# Patient Record
Sex: MALE | Race: WHITE | NOT HISPANIC OR LATINO | Employment: OTHER | URBAN - METROPOLITAN AREA
[De-identification: names, ages, dates, MRNs, and addresses within clinical notes are randomized per-mention and may not be internally consistent; named-entity substitution may affect disease eponyms.]

---

## 2017-01-06 DIAGNOSIS — R39.15 URGENCY OF URINATION: ICD-10-CM

## 2017-01-06 DIAGNOSIS — R39.12 WEAK URINARY STREAM: ICD-10-CM

## 2017-01-06 DIAGNOSIS — R35.0 URINARY FREQUENCY: ICD-10-CM

## 2017-01-06 LAB
ALBUMIN UR-MCNC: NEGATIVE MG/DL
APPEARANCE UR: CLEAR
BILIRUB UR QL STRIP: NEGATIVE
COLOR UR AUTO: YELLOW
GLUCOSE UR STRIP-MCNC: NEGATIVE MG/DL
HGB UR QL STRIP: NEGATIVE
KETONES UR STRIP-MCNC: NEGATIVE MG/DL
LEUKOCYTE ESTERASE UR QL STRIP: ABNORMAL
MUCOUS THREADS #/AREA URNS LPF: PRESENT /LPF
NITRATE UR QL: NEGATIVE
NON-SQ EPI CELLS #/AREA URNS LPF: ABNORMAL /LPF
PH UR STRIP: 5.5 PH (ref 5–7)
RBC #/AREA URNS AUTO: ABNORMAL /HPF (ref 0–2)
SP GR UR STRIP: 1.01 (ref 1–1.03)
URN SPEC COLLECT METH UR: ABNORMAL
UROBILINOGEN UR STRIP-ACNC: 0.2 EU/DL (ref 0.2–1)
WBC #/AREA URNS AUTO: ABNORMAL /HPF (ref 0–2)

## 2017-01-06 PROCEDURE — 87086 URINE CULTURE/COLONY COUNT: CPT | Mod: 90 | Performed by: UROLOGY

## 2017-01-06 PROCEDURE — 81001 URINALYSIS AUTO W/SCOPE: CPT | Performed by: UROLOGY

## 2017-01-06 PROCEDURE — 99000 SPECIMEN HANDLING OFFICE-LAB: CPT | Performed by: UROLOGY

## 2017-01-08 LAB
BACTERIA SPEC CULT: NORMAL
MICRO REPORT STATUS: NORMAL
SPECIMEN SOURCE: NORMAL

## 2017-02-07 ENCOUNTER — MEDICAL CORRESPONDENCE (OUTPATIENT)
Dept: HEALTH INFORMATION MANAGEMENT | Facility: CLINIC | Age: 77
End: 2017-02-07

## 2017-02-07 DIAGNOSIS — N13.8 HYPERTROPHY OF PROSTATE WITH URINARY OBSTRUCTION: Primary | ICD-10-CM

## 2017-02-07 DIAGNOSIS — N40.1 HYPERTROPHY OF PROSTATE WITH URINARY OBSTRUCTION: Primary | ICD-10-CM

## 2017-02-16 DIAGNOSIS — N40.1 HYPERTROPHY OF PROSTATE WITH URINARY OBSTRUCTION: Primary | ICD-10-CM

## 2017-02-16 DIAGNOSIS — N13.8 HYPERTROPHY OF PROSTATE WITH URINARY OBSTRUCTION: Primary | ICD-10-CM

## 2017-02-16 PROCEDURE — 87086 URINE CULTURE/COLONY COUNT: CPT | Performed by: UROLOGY

## 2017-02-18 LAB
BACTERIA SPEC CULT: NO GROWTH
MICRO REPORT STATUS: NORMAL
SPECIMEN SOURCE: NORMAL

## 2017-05-25 DIAGNOSIS — N13.8 HYPERTROPHY OF PROSTATE WITH URINARY OBSTRUCTION: Primary | ICD-10-CM

## 2017-05-25 DIAGNOSIS — N40.1 HYPERTROPHY OF PROSTATE WITH URINARY OBSTRUCTION: Primary | ICD-10-CM

## 2017-05-25 DIAGNOSIS — N39.41 URGE INCONTINENCE: ICD-10-CM

## 2017-05-25 LAB
ALBUMIN UR-MCNC: 30 MG/DL
APPEARANCE UR: ABNORMAL
BACTERIA #/AREA URNS HPF: ABNORMAL /HPF
BILIRUB UR QL STRIP: NEGATIVE
COLOR UR AUTO: YELLOW
GLUCOSE UR STRIP-MCNC: NEGATIVE MG/DL
HGB UR QL STRIP: NEGATIVE
HYALINE CASTS #/AREA URNS LPF: ABNORMAL /LPF (ref 0–2)
KETONES UR STRIP-MCNC: NEGATIVE MG/DL
LEUKOCYTE ESTERASE UR QL STRIP: NEGATIVE
MUCOUS THREADS #/AREA URNS LPF: PRESENT /LPF
NITRATE UR QL: NEGATIVE
NON-SQ EPI CELLS #/AREA URNS LPF: ABNORMAL /LPF
PH UR STRIP: 5 PH (ref 5–7)
RBC #/AREA URNS AUTO: ABNORMAL /HPF (ref 0–2)
SP GR UR STRIP: >1.03 (ref 1–1.03)
URN SPEC COLLECT METH UR: ABNORMAL
UROBILINOGEN UR STRIP-ACNC: 0.2 EU/DL (ref 0.2–1)
WBC #/AREA URNS AUTO: ABNORMAL /HPF (ref 0–2)

## 2017-05-25 PROCEDURE — 87086 URINE CULTURE/COLONY COUNT: CPT | Performed by: UROLOGY

## 2017-05-25 PROCEDURE — 81001 URINALYSIS AUTO W/SCOPE: CPT | Performed by: UROLOGY

## 2017-05-27 LAB
BACTERIA SPEC CULT: NORMAL
MICRO REPORT STATUS: NORMAL
SPECIMEN SOURCE: NORMAL

## 2018-06-05 DIAGNOSIS — R35.1 NOCTURIA: ICD-10-CM

## 2018-06-05 DIAGNOSIS — N13.8 HYPERTROPHY OF PROSTATE WITH URINARY OBSTRUCTION: Primary | ICD-10-CM

## 2018-06-05 DIAGNOSIS — N40.1 HYPERTROPHY OF PROSTATE WITH URINARY OBSTRUCTION: Primary | ICD-10-CM

## 2018-06-05 DIAGNOSIS — R39.15 URGENCY OF URINATION: ICD-10-CM

## 2018-06-05 LAB
ALBUMIN UR-MCNC: 30 MG/DL
APPEARANCE UR: CLEAR
BACTERIA #/AREA URNS HPF: ABNORMAL /HPF
BILIRUB UR QL STRIP: ABNORMAL
COLOR UR AUTO: YELLOW
GLUCOSE UR STRIP-MCNC: NEGATIVE MG/DL
HGB UR QL STRIP: NEGATIVE
KETONES UR STRIP-MCNC: ABNORMAL MG/DL
LEUKOCYTE ESTERASE UR QL STRIP: NEGATIVE
MUCOUS THREADS #/AREA URNS LPF: PRESENT /LPF
NITRATE UR QL: NEGATIVE
NON-SQ EPI CELLS #/AREA URNS LPF: ABNORMAL /LPF
PH UR STRIP: 5 PH (ref 5–7)
PSA SERPL-MCNC: 2.32 UG/L (ref 0–4)
RBC #/AREA URNS AUTO: ABNORMAL /HPF
SOURCE: ABNORMAL
SP GR UR STRIP: 1.02 (ref 1–1.03)
UROBILINOGEN UR STRIP-ACNC: 0.2 EU/DL (ref 0.2–1)
WBC #/AREA URNS AUTO: ABNORMAL /HPF

## 2018-06-05 PROCEDURE — 87086 URINE CULTURE/COLONY COUNT: CPT | Performed by: UROLOGY

## 2018-06-05 PROCEDURE — 36415 COLL VENOUS BLD VENIPUNCTURE: CPT | Performed by: UROLOGY

## 2018-06-05 PROCEDURE — 84153 ASSAY OF PSA TOTAL: CPT | Performed by: UROLOGY

## 2018-06-05 PROCEDURE — 81001 URINALYSIS AUTO W/SCOPE: CPT | Performed by: UROLOGY

## 2018-06-06 LAB
BACTERIA SPEC CULT: NO GROWTH
Lab: NORMAL
SPECIMEN SOURCE: NORMAL

## 2019-06-11 DIAGNOSIS — R39.15 BENIGN PROSTATIC HYPERPLASIA (BPH) WITH URINARY URGENCY: ICD-10-CM

## 2019-06-11 DIAGNOSIS — N40.0 BENIGN ENLARGEMENT OF PROSTATE: ICD-10-CM

## 2019-06-11 DIAGNOSIS — R35.0 URINARY FREQUENCY: Primary | ICD-10-CM

## 2019-06-11 DIAGNOSIS — N40.1 BENIGN PROSTATIC HYPERPLASIA (BPH) WITH URINARY URGENCY: ICD-10-CM

## 2019-06-11 LAB
ALBUMIN UR-MCNC: NEGATIVE MG/DL
APPEARANCE UR: CLEAR
BACTERIA #/AREA URNS HPF: ABNORMAL /HPF
BILIRUB UR QL STRIP: NEGATIVE
COLOR UR AUTO: YELLOW
GLUCOSE UR STRIP-MCNC: NEGATIVE MG/DL
HGB UR QL STRIP: NEGATIVE
KETONES UR STRIP-MCNC: NEGATIVE MG/DL
LEUKOCYTE ESTERASE UR QL STRIP: NEGATIVE
MUCOUS THREADS #/AREA URNS LPF: PRESENT /LPF
NITRATE UR QL: NEGATIVE
NON-SQ EPI CELLS #/AREA URNS LPF: ABNORMAL /LPF
PH UR STRIP: 5.5 PH (ref 5–7)
PSA SERPL-MCNC: 2.86 UG/L (ref 0–4)
RBC #/AREA URNS AUTO: ABNORMAL /HPF
SOURCE: ABNORMAL
SP GR UR STRIP: 1.02 (ref 1–1.03)
UROBILINOGEN UR STRIP-ACNC: 0.2 EU/DL (ref 0.2–1)
WBC #/AREA URNS AUTO: ABNORMAL /HPF

## 2019-06-11 PROCEDURE — 36415 COLL VENOUS BLD VENIPUNCTURE: CPT | Performed by: UROLOGY

## 2019-06-11 PROCEDURE — 84153 ASSAY OF PSA TOTAL: CPT | Performed by: UROLOGY

## 2019-06-11 PROCEDURE — 87086 URINE CULTURE/COLONY COUNT: CPT | Performed by: UROLOGY

## 2019-06-11 PROCEDURE — 81001 URINALYSIS AUTO W/SCOPE: CPT | Performed by: UROLOGY

## 2019-06-12 LAB
BACTERIA SPEC CULT: NO GROWTH
Lab: NORMAL
SPECIMEN SOURCE: NORMAL

## 2020-07-01 DIAGNOSIS — R39.15 URGENCY OF URINATION: ICD-10-CM

## 2020-07-01 DIAGNOSIS — N40.0 BENIGN ENLARGEMENT OF PROSTATE: Primary | ICD-10-CM

## 2020-07-01 LAB
ALBUMIN UR-MCNC: NEGATIVE MG/DL
APPEARANCE UR: CLEAR
BACTERIA #/AREA URNS HPF: ABNORMAL /HPF
BILIRUB UR QL STRIP: ABNORMAL
COLOR UR AUTO: YELLOW
GLUCOSE UR STRIP-MCNC: NEGATIVE MG/DL
HGB UR QL STRIP: NEGATIVE
KETONES UR STRIP-MCNC: NEGATIVE MG/DL
LEUKOCYTE ESTERASE UR QL STRIP: NEGATIVE
NITRATE UR QL: NEGATIVE
NON-SQ EPI CELLS #/AREA URNS LPF: ABNORMAL /LPF
PH UR STRIP: 5.5 PH (ref 5–7)
PSA SERPL-MCNC: 2.74 UG/L (ref 0–4)
RBC #/AREA URNS AUTO: ABNORMAL /HPF
SOURCE: ABNORMAL
SP GR UR STRIP: 1.02 (ref 1–1.03)
UROBILINOGEN UR STRIP-ACNC: 0.2 EU/DL (ref 0.2–1)
WBC #/AREA URNS AUTO: ABNORMAL /HPF

## 2020-07-01 PROCEDURE — 36415 COLL VENOUS BLD VENIPUNCTURE: CPT | Performed by: UROLOGY

## 2020-07-01 PROCEDURE — 87086 URINE CULTURE/COLONY COUNT: CPT | Performed by: UROLOGY

## 2020-07-01 PROCEDURE — 81001 URINALYSIS AUTO W/SCOPE: CPT | Performed by: UROLOGY

## 2020-07-01 PROCEDURE — 84153 ASSAY OF PSA TOTAL: CPT | Performed by: UROLOGY

## 2020-07-02 LAB
BACTERIA SPEC CULT: NO GROWTH
Lab: NORMAL
SPECIMEN SOURCE: NORMAL

## 2020-10-29 ENCOUNTER — TELEPHONE (OUTPATIENT)
Dept: SCHEDULING | Facility: CLINIC | Age: 80
End: 2020-10-29

## 2021-07-27 ENCOUNTER — MEDICAL CORRESPONDENCE (OUTPATIENT)
Dept: HEALTH INFORMATION MANAGEMENT | Facility: CLINIC | Age: 81
End: 2021-07-27

## 2021-07-27 ENCOUNTER — LAB (OUTPATIENT)
Dept: LAB | Facility: OTHER | Age: 81
End: 2021-07-27
Payer: COMMERCIAL

## 2021-07-27 DIAGNOSIS — R35.1 NOCTURIA: ICD-10-CM

## 2021-07-27 DIAGNOSIS — R39.15 URGENCY OF URINATION: ICD-10-CM

## 2021-07-27 DIAGNOSIS — R33.8 BENIGN LOCALIZED HYPERPLASIA OF PROSTATE WITH URINARY RETENTION: Primary | ICD-10-CM

## 2021-07-27 DIAGNOSIS — N40.1 BENIGN LOCALIZED HYPERPLASIA OF PROSTATE WITH URINARY RETENTION: Primary | ICD-10-CM

## 2021-07-27 LAB
ALBUMIN UR-MCNC: 30 MG/DL
APPEARANCE UR: CLEAR
BACTERIA #/AREA URNS HPF: ABNORMAL /HPF
BILIRUB UR QL STRIP: ABNORMAL
COLOR UR AUTO: YELLOW
GLUCOSE UR STRIP-MCNC: NEGATIVE MG/DL
HGB UR QL STRIP: NEGATIVE
HYALINE CASTS #/AREA URNS LPF: ABNORMAL /LPF
KETONES UR STRIP-MCNC: ABNORMAL MG/DL
LEUKOCYTE ESTERASE UR QL STRIP: NEGATIVE
NITRATE UR QL: NEGATIVE
PH UR STRIP: 5.5 [PH] (ref 5–7)
PSA SERPL-MCNC: 4.42 UG/L (ref 0–4)
RBC #/AREA URNS AUTO: ABNORMAL /HPF
SP GR UR STRIP: >=1.03 (ref 1–1.03)
SQUAMOUS #/AREA URNS AUTO: ABNORMAL /LPF
UROBILINOGEN UR STRIP-ACNC: 0.2 E.U./DL
WBC #/AREA URNS AUTO: ABNORMAL /HPF

## 2021-07-27 PROCEDURE — 84153 ASSAY OF PSA TOTAL: CPT

## 2021-07-27 PROCEDURE — 87086 URINE CULTURE/COLONY COUNT: CPT

## 2021-07-27 PROCEDURE — 36415 COLL VENOUS BLD VENIPUNCTURE: CPT

## 2021-07-27 PROCEDURE — 81001 URINALYSIS AUTO W/SCOPE: CPT

## 2021-07-28 LAB — BACTERIA UR CULT: NO GROWTH

## 2023-01-01 ENCOUNTER — APPOINTMENT (OUTPATIENT)
Dept: CT IMAGING | Facility: CLINIC | Age: 83
DRG: 871 | End: 2023-01-01
Attending: PEDIATRICS
Payer: COMMERCIAL

## 2023-01-01 ENCOUNTER — APPOINTMENT (OUTPATIENT)
Dept: CT IMAGING | Facility: CLINIC | Age: 83
DRG: 871 | End: 2023-01-01
Attending: FAMILY MEDICINE
Payer: COMMERCIAL

## 2023-01-01 ENCOUNTER — LAB REQUISITION (OUTPATIENT)
Dept: LAB | Facility: CLINIC | Age: 83
End: 2023-01-01
Payer: COMMERCIAL

## 2023-01-01 ENCOUNTER — HOSPITAL ENCOUNTER (INPATIENT)
Facility: CLINIC | Age: 83
LOS: 7 days | Discharge: ANOTHER HOSPITAL | DRG: 871 | End: 2023-10-01
Attending: STUDENT IN AN ORGANIZED HEALTH CARE EDUCATION/TRAINING PROGRAM | Admitting: INTERNAL MEDICINE
Payer: COMMERCIAL

## 2023-01-01 ENCOUNTER — APPOINTMENT (OUTPATIENT)
Dept: CARDIOLOGY | Facility: CLINIC | Age: 83
DRG: 871 | End: 2023-01-01
Attending: NURSE PRACTITIONER
Payer: COMMERCIAL

## 2023-01-01 ENCOUNTER — APPOINTMENT (OUTPATIENT)
Dept: CT IMAGING | Facility: CLINIC | Age: 83
DRG: 871 | End: 2023-01-01
Attending: STUDENT IN AN ORGANIZED HEALTH CARE EDUCATION/TRAINING PROGRAM
Payer: COMMERCIAL

## 2023-01-01 ENCOUNTER — HOSPITAL ENCOUNTER (EMERGENCY)
Facility: CLINIC | Age: 83
Discharge: HOME | DRG: 871 | End: 2023-09-22
Attending: STUDENT IN AN ORGANIZED HEALTH CARE EDUCATION/TRAINING PROGRAM | Admitting: STUDENT IN AN ORGANIZED HEALTH CARE EDUCATION/TRAINING PROGRAM
Payer: COMMERCIAL

## 2023-01-01 ENCOUNTER — NURSE TRIAGE (OUTPATIENT)
Dept: NURSING | Facility: CLINIC | Age: 83
End: 2023-01-01
Payer: COMMERCIAL

## 2023-01-01 ENCOUNTER — APPOINTMENT (OUTPATIENT)
Dept: GENERAL RADIOLOGY | Facility: CLINIC | Age: 83
DRG: 871 | End: 2023-01-01
Attending: STUDENT IN AN ORGANIZED HEALTH CARE EDUCATION/TRAINING PROGRAM
Payer: COMMERCIAL

## 2023-01-01 ENCOUNTER — HOSPITAL ENCOUNTER (OUTPATIENT)
Dept: ULTRASOUND IMAGING | Facility: CLINIC | Age: 83
Discharge: HOME | DRG: 871 | End: 2023-09-29
Attending: PEDIATRICS
Payer: COMMERCIAL

## 2023-01-01 VITALS
OXYGEN SATURATION: 92 % | SYSTOLIC BLOOD PRESSURE: 103 MMHG | RESPIRATION RATE: 22 BRPM | BODY MASS INDEX: 32.72 KG/M2 | DIASTOLIC BLOOD PRESSURE: 74 MMHG | WEIGHT: 206 LBS | HEART RATE: 91 BPM | TEMPERATURE: 97.9 F

## 2023-01-01 VITALS
BODY MASS INDEX: 33.43 KG/M2 | HEART RATE: 107 BPM | HEIGHT: 66 IN | WEIGHT: 208 LBS | TEMPERATURE: 99.3 F | SYSTOLIC BLOOD PRESSURE: 117 MMHG | OXYGEN SATURATION: 92 % | RESPIRATION RATE: 20 BRPM | DIASTOLIC BLOOD PRESSURE: 63 MMHG

## 2023-01-01 DIAGNOSIS — C34.90 LUNG CANCER (H): Primary | ICD-10-CM

## 2023-01-01 DIAGNOSIS — A15.0 TUBERCULOSIS OF LUNG: ICD-10-CM

## 2023-01-01 DIAGNOSIS — I25.10 ATHEROSCLEROSIS OF NATIVE CORONARY ARTERY OF NATIVE HEART WITHOUT ANGINA PECTORIS: ICD-10-CM

## 2023-01-01 DIAGNOSIS — J15.0 PNEUMONIA DUE TO KLEBSIELLA PNEUMONIAE (H): ICD-10-CM

## 2023-01-01 DIAGNOSIS — C34.12 PRIMARY NON-SMALL CELL CARCINOMA OF UPPER LOBE OF LEFT LUNG (H): Primary | ICD-10-CM

## 2023-01-01 DIAGNOSIS — J18.9 PNEUMONIA DUE TO INFECTIOUS ORGANISM, UNSPECIFIED LATERALITY, UNSPECIFIED PART OF LUNG: Primary | ICD-10-CM

## 2023-01-01 DIAGNOSIS — I50.9 HEART FAILURE, UNSPECIFIED (H): ICD-10-CM

## 2023-01-01 DIAGNOSIS — F03.90 SENILE DEMENTIA (H): ICD-10-CM

## 2023-01-01 DIAGNOSIS — J15.9 COMMUNITY ACQUIRED BACTERIAL PNEUMONIA: ICD-10-CM

## 2023-01-01 DIAGNOSIS — J18.9 PNEUMONIA: Primary | ICD-10-CM

## 2023-01-01 DIAGNOSIS — J44.1 COPD EXACERBATION (H): ICD-10-CM

## 2023-01-01 DIAGNOSIS — D72.829 LEUKOCYTOSIS, UNSPECIFIED TYPE: ICD-10-CM

## 2023-01-01 DIAGNOSIS — D84.9 IMMUNOCOMPROMISED (H): ICD-10-CM

## 2023-01-01 DIAGNOSIS — Z95.828 PORT-A-CATH IN PLACE: ICD-10-CM

## 2023-01-01 DIAGNOSIS — E78.5 HYPERLIPIDEMIA WITH TARGET LDL LESS THAN 70: ICD-10-CM

## 2023-01-01 DIAGNOSIS — J90 PLEURAL EFFUSION ON LEFT: ICD-10-CM

## 2023-01-01 DIAGNOSIS — I10 HYPERTENSION GOAL BP (BLOOD PRESSURE) < 140/90: ICD-10-CM

## 2023-01-01 DIAGNOSIS — J18.9 PNEUMONIA OF LEFT UPPER LOBE DUE TO INFECTIOUS ORGANISM: ICD-10-CM

## 2023-01-01 DIAGNOSIS — R65.10 SIRS (SYSTEMIC INFLAMMATORY RESPONSE SYNDROME) (H): ICD-10-CM

## 2023-01-01 DIAGNOSIS — J96.21 ACUTE ON CHRONIC RESPIRATORY FAILURE WITH HYPOXIA (H): ICD-10-CM

## 2023-01-01 LAB
ALBUMIN SERPL BCG-MCNC: 4.1 G/DL (ref 3.5–5.2)
ALBUMIN SERPL BCG-MCNC: 4.3 G/DL (ref 3.5–5.2)
ALBUMIN UR-MCNC: 30 MG/DL
ALP SERPL-CCNC: 101 U/L (ref 40–129)
ALP SERPL-CCNC: 94 U/L (ref 40–129)
ALT SERPL W P-5'-P-CCNC: 20 U/L (ref 0–70)
ALT SERPL W P-5'-P-CCNC: 22 U/L (ref 0–70)
AMORPH CRY #/AREA URNS HPF: ABNORMAL /HPF
ANION GAP SERPL CALCULATED.3IONS-SCNC: 10 MMOL/L (ref 7–15)
ANION GAP SERPL CALCULATED.3IONS-SCNC: 11 MMOL/L (ref 7–15)
ANION GAP SERPL CALCULATED.3IONS-SCNC: 12 MMOL/L (ref 7–15)
ANION GAP SERPL CALCULATED.3IONS-SCNC: 13 MMOL/L (ref 7–15)
ANION GAP SERPL CALCULATED.3IONS-SCNC: 14 MMOL/L (ref 7–15)
ANION GAP SERPL CALCULATED.3IONS-SCNC: 14 MMOL/L (ref 7–15)
ANION GAP SERPL CALCULATED.3IONS-SCNC: 9 MMOL/L (ref 7–15)
APPEARANCE FLD: ABNORMAL
APPEARANCE UR: ABNORMAL
AST SERPL W P-5'-P-CCNC: 24 U/L (ref 0–45)
AST SERPL W P-5'-P-CCNC: 29 U/L (ref 0–45)
BACTERIA BLD CULT: NO GROWTH
BACTERIA BLD CULT: NO GROWTH
BACTERIA PLR CULT: NO GROWTH
BACTERIA PLR CULT: NO GROWTH
BACTERIA PLR CULT: NORMAL
BASE EXCESS BLDV CALC-SCNC: 1 MMOL/L (ref -7.7–1.9)
BASE EXCESS BLDV CALC-SCNC: 2.1 MMOL/L (ref -7.7–1.9)
BASOPHILS # BLD MANUAL: 0 10E3/UL (ref 0–0.2)
BASOPHILS NFR BLD MANUAL: 0 %
BILIRUB SERPL-MCNC: 0.6 MG/DL
BILIRUB SERPL-MCNC: 0.7 MG/DL
BILIRUB UR QL STRIP: NEGATIVE
BUN SERPL-MCNC: 10.4 MG/DL (ref 8–23)
BUN SERPL-MCNC: 10.8 MG/DL (ref 8–23)
BUN SERPL-MCNC: 12.1 MG/DL (ref 8–23)
BUN SERPL-MCNC: 12.7 MG/DL (ref 8–23)
BUN SERPL-MCNC: 12.7 MG/DL (ref 8–23)
BUN SERPL-MCNC: 13.8 MG/DL (ref 8–23)
BUN SERPL-MCNC: 17.9 MG/DL (ref 8–23)
BUN SERPL-MCNC: 8.7 MG/DL (ref 8–23)
BUN SERPL-MCNC: 8.9 MG/DL (ref 8–23)
BUN SERPL-MCNC: 9 MG/DL (ref 8–23)
BUN SERPL-MCNC: 9.7 MG/DL (ref 8–23)
CALCIUM SERPL-MCNC: 8.4 MG/DL (ref 8.8–10.2)
CALCIUM SERPL-MCNC: 8.5 MG/DL (ref 8.8–10.2)
CALCIUM SERPL-MCNC: 8.6 MG/DL (ref 8.8–10.2)
CALCIUM SERPL-MCNC: 8.6 MG/DL (ref 8.8–10.2)
CALCIUM SERPL-MCNC: 8.8 MG/DL (ref 8.8–10.2)
CALCIUM SERPL-MCNC: 9 MG/DL (ref 8.8–10.2)
CALCIUM SERPL-MCNC: 9.1 MG/DL (ref 8.8–10.2)
CALCIUM SERPL-MCNC: 9.2 MG/DL (ref 8.8–10.2)
CALCIUM SERPL-MCNC: 9.3 MG/DL (ref 8.8–10.2)
CELL COUNT BODY FLUID SOURCE: ABNORMAL
CHLORIDE SERPL-SCNC: 100 MMOL/L (ref 98–107)
CHLORIDE SERPL-SCNC: 101 MMOL/L (ref 98–107)
CHLORIDE SERPL-SCNC: 102 MMOL/L (ref 98–107)
CHLORIDE SERPL-SCNC: 103 MMOL/L (ref 98–107)
CHLORIDE SERPL-SCNC: 103 MMOL/L (ref 98–107)
CHLORIDE SERPL-SCNC: 104 MMOL/L (ref 98–107)
CHLORIDE SERPL-SCNC: 98 MMOL/L (ref 98–107)
CHOLEST FLD-MCNC: 68 MG/DL
CHOLESTEROL BODY FLUID SOURCE: NORMAL
COLOR FLD: ABNORMAL
COLOR UR AUTO: YELLOW
CREAT SERPL-MCNC: 0.9 MG/DL (ref 0.67–1.17)
CREAT SERPL-MCNC: 0.91 MG/DL (ref 0.67–1.17)
CREAT SERPL-MCNC: 0.93 MG/DL (ref 0.67–1.17)
CREAT SERPL-MCNC: 0.93 MG/DL (ref 0.67–1.17)
CREAT SERPL-MCNC: 0.95 MG/DL (ref 0.67–1.17)
CREAT SERPL-MCNC: 0.97 MG/DL (ref 0.67–1.17)
CREAT SERPL-MCNC: 0.98 MG/DL (ref 0.67–1.17)
CREAT SERPL-MCNC: 1.05 MG/DL (ref 0.67–1.17)
CREAT SERPL-MCNC: 1.08 MG/DL (ref 0.67–1.17)
CREAT SERPL-MCNC: 1.1 MG/DL (ref 0.67–1.17)
CREAT SERPL-MCNC: 1.15 MG/DL (ref 0.67–1.17)
CRP SERPL-MCNC: 114.96 MG/L
CRP SERPL-MCNC: 17 MG/L
CRP SERPL-MCNC: 244.68 MG/L
CRP SERPL-MCNC: 61.24 MG/L
CRP SERPL-MCNC: 71.69 MG/L
CRP SERPL-MCNC: 71.69 MG/L
CRP SERPL-MCNC: 89.13 MG/L
CRP SERPL-MCNC: 90.34 MG/L
DEPRECATED HCO3 PLAS-SCNC: 21 MMOL/L (ref 22–29)
DEPRECATED HCO3 PLAS-SCNC: 23 MMOL/L (ref 22–29)
DEPRECATED HCO3 PLAS-SCNC: 24 MMOL/L (ref 22–29)
DEPRECATED HCO3 PLAS-SCNC: 30 MMOL/L (ref 22–29)
EGFRCR SERPLBLD CKD-EPI 2021: 64 ML/MIN/1.73M2
EGFRCR SERPLBLD CKD-EPI 2021: 67 ML/MIN/1.73M2
EGFRCR SERPLBLD CKD-EPI 2021: 69 ML/MIN/1.73M2
EGFRCR SERPLBLD CKD-EPI 2021: 71 ML/MIN/1.73M2
EGFRCR SERPLBLD CKD-EPI 2021: 77 ML/MIN/1.73M2
EGFRCR SERPLBLD CKD-EPI 2021: 78 ML/MIN/1.73M2
EGFRCR SERPLBLD CKD-EPI 2021: 80 ML/MIN/1.73M2
EGFRCR SERPLBLD CKD-EPI 2021: 82 ML/MIN/1.73M2
EGFRCR SERPLBLD CKD-EPI 2021: 82 ML/MIN/1.73M2
EGFRCR SERPLBLD CKD-EPI 2021: 84 ML/MIN/1.73M2
EGFRCR SERPLBLD CKD-EPI 2021: 85 ML/MIN/1.73M2
EOSINOPHIL # BLD MANUAL: 0 10E3/UL (ref 0–0.7)
EOSINOPHIL NFR BLD MANUAL: 0 %
ERYTHROCYTE [DISTWIDTH] IN BLOOD BY AUTOMATED COUNT: 16.5 % (ref 10–15)
ERYTHROCYTE [DISTWIDTH] IN BLOOD BY AUTOMATED COUNT: 16.5 % (ref 10–15)
ERYTHROCYTE [DISTWIDTH] IN BLOOD BY AUTOMATED COUNT: 16.6 % (ref 10–15)
ERYTHROCYTE [DISTWIDTH] IN BLOOD BY AUTOMATED COUNT: 16.7 % (ref 10–15)
ERYTHROCYTE [DISTWIDTH] IN BLOOD BY AUTOMATED COUNT: 16.8 % (ref 10–15)
ERYTHROCYTE [DISTWIDTH] IN BLOOD BY AUTOMATED COUNT: 16.8 % (ref 10–15)
ERYTHROCYTE [DISTWIDTH] IN BLOOD BY AUTOMATED COUNT: 17.2 % (ref 10–15)
FLUAV RNA SPEC QL NAA+PROBE: NEGATIVE
FLUBV RNA RESP QL NAA+PROBE: NEGATIVE
GAMMA INTERFERON BACKGROUND BLD IA-ACNC: 0 IU/ML
GLUCOSE BLDC GLUCOMTR-MCNC: 123 MG/DL (ref 70–99)
GLUCOSE BLDC GLUCOMTR-MCNC: 97 MG/DL (ref 70–99)
GLUCOSE BLDC GLUCOMTR-MCNC: 99 MG/DL (ref 70–99)
GLUCOSE BODY FLUID SOURCE: NORMAL
GLUCOSE FLD-MCNC: 40 MG/DL
GLUCOSE SERPL-MCNC: 101 MG/DL (ref 70–99)
GLUCOSE SERPL-MCNC: 103 MG/DL (ref 70–99)
GLUCOSE SERPL-MCNC: 104 MG/DL (ref 70–99)
GLUCOSE SERPL-MCNC: 107 MG/DL (ref 70–99)
GLUCOSE SERPL-MCNC: 115 MG/DL (ref 70–99)
GLUCOSE SERPL-MCNC: 117 MG/DL (ref 70–99)
GLUCOSE SERPL-MCNC: 129 MG/DL (ref 70–99)
GLUCOSE SERPL-MCNC: 136 MG/DL (ref 70–99)
GLUCOSE SERPL-MCNC: 149 MG/DL (ref 70–99)
GLUCOSE SERPL-MCNC: 170 MG/DL (ref 70–99)
GLUCOSE SERPL-MCNC: 91 MG/DL (ref 70–99)
GLUCOSE UR STRIP-MCNC: NEGATIVE MG/DL
GRAM STAIN RESULT: NORMAL
HCO3 BLDV-SCNC: 27 MMOL/L (ref 21–28)
HCO3 BLDV-SCNC: 27 MMOL/L (ref 21–28)
HCO3 SERPL-SCNC: 25 MMOL/L (ref 22–29)
HCT VFR BLD AUTO: 33 % (ref 40–53)
HCT VFR BLD AUTO: 34.3 % (ref 40–53)
HCT VFR BLD AUTO: 34.7 % (ref 40–53)
HCT VFR BLD AUTO: 35.1 % (ref 40–53)
HCT VFR BLD AUTO: 35.7 % (ref 40–53)
HCT VFR BLD AUTO: 35.7 % (ref 40–53)
HCT VFR BLD AUTO: 36.5 % (ref 40–53)
HCT VFR BLD AUTO: 36.8 % (ref 40–53)
HCT VFR BLD AUTO: 39.2 % (ref 40–53)
HGB BLD-MCNC: 10.5 G/DL (ref 13.3–17.7)
HGB BLD-MCNC: 10.9 G/DL (ref 13.3–17.7)
HGB BLD-MCNC: 11 G/DL (ref 13.3–17.7)
HGB BLD-MCNC: 11.4 G/DL (ref 13.3–17.7)
HGB BLD-MCNC: 11.5 G/DL (ref 13.3–17.7)
HGB BLD-MCNC: 11.7 G/DL (ref 13.3–17.7)
HGB BLD-MCNC: 11.8 G/DL (ref 13.3–17.7)
HGB BLD-MCNC: 11.8 G/DL (ref 13.3–17.7)
HGB BLD-MCNC: 12.3 G/DL (ref 13.3–17.7)
HGB BLD-MCNC: 13 G/DL (ref 13.3–17.7)
HGB UR QL STRIP: NEGATIVE
HOLD SPECIMEN: NORMAL
HYALINE CASTS: 5 /LPF
INR PPP: 1.19 (ref 0.85–1.15)
KETONES UR STRIP-MCNC: NEGATIVE MG/DL
LACTATE SERPL-SCNC: 0.7 MMOL/L (ref 0.7–2)
LACTATE SERPL-SCNC: 0.7 MMOL/L (ref 0.7–2)
LACTATE SERPL-SCNC: 0.9 MMOL/L (ref 0.7–2)
LACTATE SERPL-SCNC: 1 MMOL/L (ref 0.7–2)
LACTATE SERPL-SCNC: 1 MMOL/L (ref 0.7–2)
LACTATE SERPL-SCNC: 1.1 MMOL/L (ref 0.7–2)
LACTATE SERPL-SCNC: 1.2 MMOL/L (ref 0.7–2)
LACTATE SERPL-SCNC: 2.2 MMOL/L (ref 0.7–2)
LD BODY BODY FLUID SOURCE: NORMAL
LDH FLD L TO P-CCNC: NORMAL U/L
LDH SERPL L TO P-CCNC: 238 U/L (ref 0–250)
LEUKOCYTE ESTERASE UR QL STRIP: NEGATIVE
LVEF ECHO: NORMAL
LYMPHOCYTES # BLD MANUAL: 1 10E3/UL (ref 0.8–5.3)
LYMPHOCYTES # BLD MANUAL: 1.7 10E3/UL (ref 0.8–5.3)
LYMPHOCYTES # BLD MANUAL: 1.7 10E3/UL (ref 0.8–5.3)
LYMPHOCYTES # BLD MANUAL: 3.5 10E3/UL (ref 0.8–5.3)
LYMPHOCYTES # BLD MANUAL: 6.1 10E3/UL (ref 0.8–5.3)
LYMPHOCYTES NFR BLD AUTO: 12 %
LYMPHOCYTES NFR BLD MANUAL: 11 %
LYMPHOCYTES NFR BLD MANUAL: 18 %
LYMPHOCYTES NFR BLD MANUAL: 4 %
LYMPHOCYTES NFR BLD MANUAL: 4 %
LYMPHOCYTES NFR BLD MANUAL: 6 %
LYMPHOCYTES NFR FLD MANUAL: 6 %
M TB IFN-G BLD-IMP: NORMAL
M TB IFN-G CD4+ BCKGRND COR BLD-ACNC: 0.33 IU/ML
MCH RBC QN AUTO: 27.7 PG (ref 26.5–33)
MCH RBC QN AUTO: 28.8 PG (ref 26.5–33)
MCH RBC QN AUTO: 28.8 PG (ref 26.5–33)
MCH RBC QN AUTO: 29 PG (ref 26.5–33)
MCH RBC QN AUTO: 29.1 PG (ref 26.5–33)
MCH RBC QN AUTO: 29.2 PG (ref 26.5–33)
MCH RBC QN AUTO: 29.2 PG (ref 26.5–33)
MCH RBC QN AUTO: 29.3 PG (ref 26.5–33)
MCH RBC QN AUTO: 29.3 PG (ref 26.5–33)
MCHC RBC AUTO-ENTMCNC: 29.9 G/DL (ref 31.5–36.5)
MCHC RBC AUTO-ENTMCNC: 33.1 G/DL (ref 31.5–36.5)
MCHC RBC AUTO-ENTMCNC: 33.2 G/DL (ref 31.5–36.5)
MCHC RBC AUTO-ENTMCNC: 33.2 G/DL (ref 31.5–36.5)
MCHC RBC AUTO-ENTMCNC: 33.3 G/DL (ref 31.5–36.5)
MCHC RBC AUTO-ENTMCNC: 33.3 G/DL (ref 31.5–36.5)
MCHC RBC AUTO-ENTMCNC: 33.4 G/DL (ref 31.5–36.5)
MCV RBC AUTO: 87 FL (ref 78–100)
MCV RBC AUTO: 88 FL (ref 78–100)
MCV RBC AUTO: 93 FL (ref 78–100)
MITOGEN IGNF BCKGRD COR BLD-ACNC: 0 IU/ML
MITOGEN IGNF BCKGRD COR BLD-ACNC: 0 IU/ML
MONOCYTES # BLD MANUAL: 20 10E3/UL (ref 0–1.3)
MONOCYTES # BLD MANUAL: 6.4 10E3/UL (ref 0–1.3)
MONOCYTES # BLD MANUAL: 8.5 10E3/UL (ref 0–1.3)
MONOCYTES # BLD MANUAL: 8.7 10E3/UL (ref 0–1.3)
MONOCYTES # BLD MANUAL: 9 10E3/UL (ref 0–1.3)
MONOCYTES NFR BLD AUTO: 26 %
MONOCYTES NFR BLD MANUAL: 23 %
MONOCYTES NFR BLD MANUAL: 25 %
MONOCYTES NFR BLD MANUAL: 28 %
MONOCYTES NFR BLD MANUAL: 36 %
MONOCYTES NFR BLD MANUAL: 46 %
MONOS+MACROS NFR FLD MANUAL: 24 %
MRSA DNA SPEC QL NAA+PROBE: ABNORMAL
MRSA DNA SPEC QL NAA+PROBE: NEGATIVE
MUCOUS THREADS #/AREA URNS LPF: PRESENT /LPF
NEUTROPHILS # BLD MANUAL: 14.5 10E3/UL (ref 1.6–8.3)
NEUTROPHILS # BLD MANUAL: 19.4 10E3/UL (ref 1.6–8.3)
NEUTROPHILS # BLD MANUAL: 19.6 10E3/UL (ref 1.6–8.3)
NEUTROPHILS # BLD MANUAL: 19.7 10E3/UL (ref 1.6–8.3)
NEUTROPHILS # BLD MANUAL: 21.8 10E3/UL (ref 1.6–8.3)
NEUTROPHILS NFR BLD AUTO: 62 %
NEUTROPHILS NFR BLD MANUAL: 50 %
NEUTROPHILS NFR BLD MANUAL: 57 %
NEUTROPHILS NFR BLD MANUAL: 60 %
NEUTROPHILS NFR BLD MANUAL: 61 %
NEUTROPHILS NFR BLD MANUAL: 71 %
NEUTS BAND NFR FLD MANUAL: 70 %
NEUTS HYPERSEG BLD QL SMEAR: PRESENT
NITRATE UR QL: NEGATIVE
NRBC # BLD AUTO: 0 10E3/UL
NRBC BLD AUTO-RTO: 0 /100
NT-PROBNP SERPL-MCNC: 248 PG/ML (ref 0–1800)
NT-PROBNP SERPL-MCNC: 372 PG/ML (ref 0–1800)
NT-PROBNP SERPL-MCNC: 519 PG/ML (ref 0–1800)
O2/TOTAL GAS SETTING VFR VENT: 26 %
O2/TOTAL GAS SETTING VFR VENT: 32 %
PCO2 BLDV: 42 MM HG (ref 40–50)
PCO2 BLDV: 46 MM HG (ref 40–50)
PH BLDV: 7.37 [PH] (ref 7.32–7.43)
PH BLDV: 7.41 [PH] (ref 7.32–7.43)
PH BODY FLUID SOURCE: NORMAL
PH FLD: 8 PH
PH UR STRIP: 5 [PH] (ref 5–7)
PLAT MORPH BLD: ABNORMAL
PLAT MORPH BLD: NORMAL
PLAT MORPH BLD: NORMAL
PLATELET # BLD AUTO: 106 10E3/UL (ref 150–450)
PLATELET # BLD AUTO: 106 10E3/UL (ref 150–450)
PLATELET # BLD AUTO: 107 10E3/UL (ref 150–450)
PLATELET # BLD AUTO: 110 10E3/UL (ref 150–450)
PLATELET # BLD AUTO: 117 10E3/UL (ref 150–450)
PLATELET # BLD AUTO: 127 10E3/UL (ref 150–450)
PLATELET # BLD AUTO: 143 10E3/UL (ref 150–450)
PLATELET # BLD AUTO: 149 10E3/UL (ref 150–450)
PLATELET # BLD AUTO: 184 10E3/UL (ref 150–450)
PLATELET # BLD AUTO: 202 10E3/UL (ref 150–450)
PO2 BLDV: 34 MM HG (ref 25–47)
PO2 BLDV: 35 MM HG (ref 25–47)
POLYCHROMASIA BLD QL SMEAR: SLIGHT
POTASSIUM SERPL-SCNC: 3.6 MMOL/L (ref 3.4–5.3)
POTASSIUM SERPL-SCNC: 3.7 MMOL/L (ref 3.4–5.3)
POTASSIUM SERPL-SCNC: 3.7 MMOL/L (ref 3.4–5.3)
POTASSIUM SERPL-SCNC: 3.8 MMOL/L (ref 3.4–5.3)
POTASSIUM SERPL-SCNC: 4 MMOL/L (ref 3.4–5.3)
POTASSIUM SERPL-SCNC: 4.1 MMOL/L (ref 3.4–5.3)
POTASSIUM SERPL-SCNC: 4.2 MMOL/L (ref 3.4–5.3)
POTASSIUM SERPL-SCNC: 4.3 MMOL/L (ref 3.4–5.3)
POTASSIUM SERPL-SCNC: 4.3 MMOL/L (ref 3.4–5.3)
PROCALCITONIN SERPL IA-MCNC: 0.09 NG/ML
PROCALCITONIN SERPL IA-MCNC: 0.37 NG/ML
PROT FLD-MCNC: 5.2 G/DL
PROT SERPL-MCNC: 6.5 G/DL (ref 6.4–8.3)
PROT SERPL-MCNC: 6.9 G/DL (ref 6.4–8.3)
PROT SERPL-MCNC: 7.3 G/DL (ref 6.4–8.3)
PROTEIN BODY FLUID SOURCE: NORMAL
QUANTIFERON MITOGEN: 0.33 IU/ML
QUANTIFERON NIL TUBE: 0 IU/ML
QUANTIFERON TB1 TUBE: 0 IU/ML
QUANTIFERON TB2 TUBE: 0
RBC # BLD AUTO: 3.75 10E6/UL (ref 4.4–5.9)
RBC # BLD AUTO: 3.93 10E6/UL (ref 4.4–5.9)
RBC # BLD AUTO: 3.93 10E6/UL (ref 4.4–5.9)
RBC # BLD AUTO: 3.94 10E6/UL (ref 4.4–5.9)
RBC # BLD AUTO: 4.01 10E6/UL (ref 4.4–5.9)
RBC # BLD AUTO: 4.1 10E6/UL (ref 4.4–5.9)
RBC # BLD AUTO: 4.1 10E6/UL (ref 4.4–5.9)
RBC # BLD AUTO: 4.2 10E6/UL (ref 4.4–5.9)
RBC # BLD AUTO: 4.46 10E6/UL (ref 4.4–5.9)
RBC MORPH BLD: ABNORMAL
RBC MORPH BLD: NORMAL
RBC MORPH BLD: NORMAL
RBC URINE: 1 /HPF
RSV RNA SPEC NAA+PROBE: NEGATIVE
SA TARGET DNA: ABNORMAL
SA TARGET DNA: NEGATIVE
SARS-COV-2 RNA RESP QL NAA+PROBE: NEGATIVE
SODIUM SERPL-SCNC: 133 MMOL/L (ref 135–145)
SODIUM SERPL-SCNC: 135 MMOL/L (ref 135–145)
SODIUM SERPL-SCNC: 136 MMOL/L (ref 135–145)
SODIUM SERPL-SCNC: 136 MMOL/L (ref 136–145)
SODIUM SERPL-SCNC: 136 MMOL/L (ref 136–145)
SODIUM SERPL-SCNC: 137 MMOL/L (ref 135–145)
SODIUM SERPL-SCNC: 138 MMOL/L (ref 136–145)
SODIUM SERPL-SCNC: 139 MMOL/L (ref 135–145)
SODIUM SERPL-SCNC: 140 MMOL/L (ref 135–145)
SP GR UR STRIP: 1.03 (ref 1–1.03)
SQUAMOUS EPITHELIAL: <1 /HPF
TOXIC GRANULES BLD QL SMEAR: PRESENT
TROPONIN T SERPL HS-MCNC: 14 NG/L
TROPONIN T SERPL HS-MCNC: 15 NG/L
UROBILINOGEN UR STRIP-MCNC: NORMAL MG/DL
VANCOMYCIN SERPL-MCNC: 5.4 UG/ML
VARIANT LYMPHS BLD QL SMEAR: PRESENT
WBC # BLD AUTO: 18 10E3/UL (ref 4–11)
WBC # BLD AUTO: 24 10E3/UL (ref 4–11)
WBC # BLD AUTO: 24.2 10E3/UL (ref 4–11)
WBC # BLD AUTO: 26.3 10E3/UL (ref 4–11)
WBC # BLD AUTO: 26.3 10E3/UL (ref 4–11)
WBC # BLD AUTO: 27.7 10E3/UL (ref 4–11)
WBC # BLD AUTO: 29.1 10E3/UL (ref 4–11)
WBC # BLD AUTO: 32.1 10E3/UL (ref 4–11)
WBC # BLD AUTO: 34 10E3/UL (ref 4–11)
WBC # BLD AUTO: 37.2 10E3/UL (ref 4–11)
WBC # BLD AUTO: 43.5 10E3/UL (ref 4–11)
WBC # FLD AUTO: 1489 /UL
WBC URINE: 2 /HPF

## 2023-01-01 PROCEDURE — 80048 BASIC METABOLIC PNL TOTAL CA: CPT | Performed by: INTERNAL MEDICINE

## 2023-01-01 PROCEDURE — 85007 BL SMEAR W/DIFF WBC COUNT: CPT | Performed by: STUDENT IN AN ORGANIZED HEALTH CARE EDUCATION/TRAINING PROGRAM

## 2023-01-01 PROCEDURE — 82945 GLUCOSE OTHER FLUID: CPT | Performed by: PEDIATRICS

## 2023-01-01 PROCEDURE — 87641 MR-STAPH DNA AMP PROBE: CPT | Performed by: FAMILY MEDICINE

## 2023-01-01 PROCEDURE — 89051 BODY FLUID CELL COUNT: CPT | Performed by: PEDIATRICS

## 2023-01-01 PROCEDURE — 83615 LACTATE (LD) (LDH) ENZYME: CPT | Performed by: PEDIATRICS

## 2023-01-01 PROCEDURE — 99285 EMERGENCY DEPT VISIT HI MDM: CPT | Mod: 25 | Performed by: STUDENT IN AN ORGANIZED HEALTH CARE EDUCATION/TRAINING PROGRAM

## 2023-01-01 PROCEDURE — 83880 ASSAY OF NATRIURETIC PEPTIDE: CPT | Performed by: NURSE PRACTITIONER

## 2023-01-01 PROCEDURE — 81001 URINALYSIS AUTO W/SCOPE: CPT | Performed by: PEDIATRICS

## 2023-01-01 PROCEDURE — 84157 ASSAY OF PROTEIN OTHER: CPT | Performed by: PEDIATRICS

## 2023-01-01 PROCEDURE — 120N000001 HC R&B MED SURG/OB

## 2023-01-01 PROCEDURE — 250N000009 HC RX 250: Performed by: PEDIATRICS

## 2023-01-01 PROCEDURE — 85048 AUTOMATED LEUKOCYTE COUNT: CPT | Performed by: PEDIATRICS

## 2023-01-01 PROCEDURE — 99418 PROLNG IP/OBS E/M EA 15 MIN: CPT | Performed by: PEDIATRICS

## 2023-01-01 PROCEDURE — 250N000011 HC RX IP 250 OP 636: Mod: JZ | Performed by: INTERNAL MEDICINE

## 2023-01-01 PROCEDURE — 0W9B3ZX DRAINAGE OF LEFT PLEURAL CAVITY, PERCUTANEOUS APPROACH, DIAGNOSTIC: ICD-10-PCS | Performed by: RADIOLOGY

## 2023-01-01 PROCEDURE — 84145 PROCALCITONIN (PCT): CPT | Performed by: STUDENT IN AN ORGANIZED HEALTH CARE EDUCATION/TRAINING PROGRAM

## 2023-01-01 PROCEDURE — 258N000003 HC RX IP 258 OP 636: Performed by: FAMILY MEDICINE

## 2023-01-01 PROCEDURE — 87040 BLOOD CULTURE FOR BACTERIA: CPT | Performed by: STUDENT IN AN ORGANIZED HEALTH CARE EDUCATION/TRAINING PROGRAM

## 2023-01-01 PROCEDURE — 85018 HEMOGLOBIN: CPT | Performed by: FAMILY MEDICINE

## 2023-01-01 PROCEDURE — 85007 BL SMEAR W/DIFF WBC COUNT: CPT | Performed by: PEDIATRICS

## 2023-01-01 PROCEDURE — 250N000011 HC RX IP 250 OP 636: Performed by: PEDIATRICS

## 2023-01-01 PROCEDURE — 36415 COLL VENOUS BLD VENIPUNCTURE: CPT | Performed by: STUDENT IN AN ORGANIZED HEALTH CARE EDUCATION/TRAINING PROGRAM

## 2023-01-01 PROCEDURE — 250N000013 HC RX MED GY IP 250 OP 250 PS 637: Performed by: NURSE PRACTITIONER

## 2023-01-01 PROCEDURE — 999N000157 HC STATISTIC RCP TIME EA 10 MIN

## 2023-01-01 PROCEDURE — 85048 AUTOMATED LEUKOCYTE COUNT: CPT | Performed by: FAMILY MEDICINE

## 2023-01-01 PROCEDURE — 99232 SBSQ HOSP IP/OBS MODERATE 35: CPT | Performed by: FAMILY MEDICINE

## 2023-01-01 PROCEDURE — 85007 BL SMEAR W/DIFF WBC COUNT: CPT | Performed by: NURSE PRACTITIONER

## 2023-01-01 PROCEDURE — 86140 C-REACTIVE PROTEIN: CPT | Performed by: PEDIATRICS

## 2023-01-01 PROCEDURE — 250N000011 HC RX IP 250 OP 636: Mod: JZ | Performed by: NURSE PRACTITIONER

## 2023-01-01 PROCEDURE — 250N000011 HC RX IP 250 OP 636: Performed by: FAMILY MEDICINE

## 2023-01-01 PROCEDURE — 80048 BASIC METABOLIC PNL TOTAL CA: CPT | Performed by: FAMILY MEDICINE

## 2023-01-01 PROCEDURE — 99284 EMERGENCY DEPT VISIT MOD MDM: CPT | Mod: 25 | Performed by: STUDENT IN AN ORGANIZED HEALTH CARE EDUCATION/TRAINING PROGRAM

## 2023-01-01 PROCEDURE — 93005 ELECTROCARDIOGRAM TRACING: CPT | Performed by: STUDENT IN AN ORGANIZED HEALTH CARE EDUCATION/TRAINING PROGRAM

## 2023-01-01 PROCEDURE — 250N000011 HC RX IP 250 OP 636: Performed by: STUDENT IN AN ORGANIZED HEALTH CARE EDUCATION/TRAINING PROGRAM

## 2023-01-01 PROCEDURE — 250N000009 HC RX 250: Performed by: STUDENT IN AN ORGANIZED HEALTH CARE EDUCATION/TRAINING PROGRAM

## 2023-01-01 PROCEDURE — 86140 C-REACTIVE PROTEIN: CPT | Performed by: FAMILY MEDICINE

## 2023-01-01 PROCEDURE — 250N000013 HC RX MED GY IP 250 OP 250 PS 637: Performed by: INTERNAL MEDICINE

## 2023-01-01 PROCEDURE — 36415 COLL VENOUS BLD VENIPUNCTURE: CPT | Performed by: FAMILY MEDICINE

## 2023-01-01 PROCEDURE — 82803 BLOOD GASES ANY COMBINATION: CPT | Performed by: FAMILY MEDICINE

## 2023-01-01 PROCEDURE — 999N000156 HC STATISTIC RCP CONSULT EA 30 MIN

## 2023-01-01 PROCEDURE — 85027 COMPLETE CBC AUTOMATED: CPT | Performed by: FAMILY MEDICINE

## 2023-01-01 PROCEDURE — 83605 ASSAY OF LACTIC ACID: CPT | Performed by: FAMILY MEDICINE

## 2023-01-01 PROCEDURE — 87102 FUNGUS ISOLATION CULTURE: CPT | Performed by: PEDIATRICS

## 2023-01-01 PROCEDURE — 71260 CT THORAX DX C+: CPT

## 2023-01-01 PROCEDURE — 85027 COMPLETE CBC AUTOMATED: CPT | Performed by: STUDENT IN AN ORGANIZED HEALTH CARE EDUCATION/TRAINING PROGRAM

## 2023-01-01 PROCEDURE — 250N000009 HC RX 250: Performed by: NURSE PRACTITIONER

## 2023-01-01 PROCEDURE — 71045 X-RAY EXAM CHEST 1 VIEW: CPT

## 2023-01-01 PROCEDURE — 99207 PR APP CREDIT; MD BILLING SHARED VISIT: CPT | Performed by: NURSE PRACTITIONER

## 2023-01-01 PROCEDURE — 80053 COMPREHEN METABOLIC PANEL: CPT | Performed by: STUDENT IN AN ORGANIZED HEALTH CARE EDUCATION/TRAINING PROGRAM

## 2023-01-01 PROCEDURE — 83605 ASSAY OF LACTIC ACID: CPT | Performed by: PEDIATRICS

## 2023-01-01 PROCEDURE — 85027 COMPLETE CBC AUTOMATED: CPT | Performed by: NURSE PRACTITIONER

## 2023-01-01 PROCEDURE — 99207 PR APP CREDIT; MD BILLING SHARED VISIT: CPT | Performed by: PEDIATRICS

## 2023-01-01 PROCEDURE — 82803 BLOOD GASES ANY COMBINATION: CPT | Performed by: PEDIATRICS

## 2023-01-01 PROCEDURE — P9603 ONE-WAY ALLOW PRORATED MILES: HCPCS | Performed by: FAMILY MEDICINE

## 2023-01-01 PROCEDURE — 99232 SBSQ HOSP IP/OBS MODERATE 35: CPT | Performed by: PEDIATRICS

## 2023-01-01 PROCEDURE — 84484 ASSAY OF TROPONIN QUANT: CPT | Performed by: STUDENT IN AN ORGANIZED HEALTH CARE EDUCATION/TRAINING PROGRAM

## 2023-01-01 PROCEDURE — 87070 CULTURE OTHR SPECIMN AEROBIC: CPT | Performed by: PEDIATRICS

## 2023-01-01 PROCEDURE — 83986 ASSAY PH BODY FLUID NOS: CPT | Performed by: PEDIATRICS

## 2023-01-01 PROCEDURE — 80048 BASIC METABOLIC PNL TOTAL CA: CPT | Performed by: PEDIATRICS

## 2023-01-01 PROCEDURE — 250N000009 HC RX 250: Performed by: RADIOLOGY

## 2023-01-01 PROCEDURE — 250N000013 HC RX MED GY IP 250 OP 250 PS 637: Performed by: PEDIATRICS

## 2023-01-01 PROCEDURE — 80202 ASSAY OF VANCOMYCIN: CPT | Performed by: FAMILY MEDICINE

## 2023-01-01 PROCEDURE — 82310 ASSAY OF CALCIUM: CPT | Performed by: NURSE PRACTITIONER

## 2023-01-01 PROCEDURE — 93010 ELECTROCARDIOGRAM REPORT: CPT | Performed by: STUDENT IN AN ORGANIZED HEALTH CARE EDUCATION/TRAINING PROGRAM

## 2023-01-01 PROCEDURE — 85007 BL SMEAR W/DIFF WBC COUNT: CPT | Performed by: FAMILY MEDICINE

## 2023-01-01 PROCEDURE — 99223 1ST HOSP IP/OBS HIGH 75: CPT | Performed by: INTERNAL MEDICINE

## 2023-01-01 PROCEDURE — 83605 ASSAY OF LACTIC ACID: CPT | Performed by: INTERNAL MEDICINE

## 2023-01-01 PROCEDURE — 99418 PROLNG IP/OBS E/M EA 15 MIN: CPT | Performed by: INTERNAL MEDICINE

## 2023-01-01 PROCEDURE — 87637 SARSCOV2&INF A&B&RSV AMP PRB: CPT | Performed by: STUDENT IN AN ORGANIZED HEALTH CARE EDUCATION/TRAINING PROGRAM

## 2023-01-01 PROCEDURE — 258N000003 HC RX IP 258 OP 636: Performed by: INTERNAL MEDICINE

## 2023-01-01 PROCEDURE — 250N000011 HC RX IP 250 OP 636: Mod: JZ | Performed by: PEDIATRICS

## 2023-01-01 PROCEDURE — 83880 ASSAY OF NATRIURETIC PEPTIDE: CPT | Performed by: STUDENT IN AN ORGANIZED HEALTH CARE EDUCATION/TRAINING PROGRAM

## 2023-01-01 PROCEDURE — 999N000123 HC STATISTIC OXYGEN O2DAILY TECH TIME

## 2023-01-01 PROCEDURE — 84155 ASSAY OF PROTEIN SERUM: CPT | Performed by: PEDIATRICS

## 2023-01-01 PROCEDURE — 93005 ELECTROCARDIOGRAM TRACING: CPT

## 2023-01-01 PROCEDURE — C1729 CATH, DRAINAGE: HCPCS

## 2023-01-01 PROCEDURE — 99239 HOSP IP/OBS DSCHRG MGMT >30: CPT | Performed by: PEDIATRICS

## 2023-01-01 PROCEDURE — 99285 EMERGENCY DEPT VISIT HI MDM: CPT | Mod: 25

## 2023-01-01 PROCEDURE — 250N000011 HC RX IP 250 OP 636: Mod: JZ | Performed by: FAMILY MEDICINE

## 2023-01-01 PROCEDURE — 87075 CULTR BACTERIA EXCEPT BLOOD: CPT | Performed by: PEDIATRICS

## 2023-01-01 PROCEDURE — 86481 TB AG RESPONSE T-CELL SUSP: CPT | Performed by: FAMILY MEDICINE

## 2023-01-01 PROCEDURE — 87205 SMEAR GRAM STAIN: CPT | Performed by: PEDIATRICS

## 2023-01-01 PROCEDURE — 85014 HEMATOCRIT: CPT | Performed by: STUDENT IN AN ORGANIZED HEALTH CARE EDUCATION/TRAINING PROGRAM

## 2023-01-01 PROCEDURE — 99233 SBSQ HOSP IP/OBS HIGH 50: CPT | Performed by: PEDIATRICS

## 2023-01-01 PROCEDURE — 250N000013 HC RX MED GY IP 250 OP 250 PS 637: Performed by: STUDENT IN AN ORGANIZED HEALTH CARE EDUCATION/TRAINING PROGRAM

## 2023-01-01 PROCEDURE — 93306 TTE W/DOPPLER COMPLETE: CPT | Mod: 26 | Performed by: INTERNAL MEDICINE

## 2023-01-01 PROCEDURE — 83605 ASSAY OF LACTIC ACID: CPT | Performed by: STUDENT IN AN ORGANIZED HEALTH CARE EDUCATION/TRAINING PROGRAM

## 2023-01-01 PROCEDURE — 258N000003 HC RX IP 258 OP 636: Performed by: STUDENT IN AN ORGANIZED HEALTH CARE EDUCATION/TRAINING PROGRAM

## 2023-01-01 PROCEDURE — 84145 PROCALCITONIN (PCT): CPT | Performed by: PEDIATRICS

## 2023-01-01 PROCEDURE — 96365 THER/PROPH/DIAG IV INF INIT: CPT

## 2023-01-01 PROCEDURE — 85049 AUTOMATED PLATELET COUNT: CPT | Performed by: INTERNAL MEDICINE

## 2023-01-01 PROCEDURE — 93306 TTE W/DOPPLER COMPLETE: CPT

## 2023-01-01 PROCEDURE — 250N000011 HC RX IP 250 OP 636: Mod: JZ | Performed by: STUDENT IN AN ORGANIZED HEALTH CARE EDUCATION/TRAINING PROGRAM

## 2023-01-01 PROCEDURE — 250N000009 HC RX 250: Performed by: FAMILY MEDICINE

## 2023-01-01 PROCEDURE — 82465 ASSAY BLD/SERUM CHOLESTEROL: CPT | Performed by: PEDIATRICS

## 2023-01-01 PROCEDURE — 85610 PROTHROMBIN TIME: CPT | Performed by: PEDIATRICS

## 2023-01-01 PROCEDURE — 85027 COMPLETE CBC AUTOMATED: CPT | Performed by: INTERNAL MEDICINE

## 2023-01-01 RX ORDER — IOPAMIDOL 755 MG/ML
500 INJECTION, SOLUTION INTRAVASCULAR ONCE
Status: COMPLETED | OUTPATIENT
Start: 2023-01-01 | End: 2023-01-01

## 2023-01-01 RX ORDER — METOPROLOL SUCCINATE 100 MG/1
100 TABLET, EXTENDED RELEASE ORAL DAILY
Status: DISCONTINUED | OUTPATIENT
Start: 2023-01-01 | End: 2023-01-01

## 2023-01-01 RX ORDER — LATANOPROST 50 UG/ML
1 SOLUTION/ DROPS OPHTHALMIC
Status: DISCONTINUED | OUTPATIENT
Start: 2023-01-01 | End: 2023-01-01 | Stop reason: HOSPADM

## 2023-01-01 RX ORDER — FUROSEMIDE 10 MG/ML
20 INJECTION INTRAMUSCULAR; INTRAVENOUS ONCE
Status: COMPLETED | OUTPATIENT
Start: 2023-01-01 | End: 2023-01-01

## 2023-01-01 RX ORDER — AMPICILLIN AND SULBACTAM 2; 1 G/1; G/1
3 INJECTION, POWDER, FOR SOLUTION INTRAMUSCULAR; INTRAVENOUS EVERY 6 HOURS
Status: DISCONTINUED | OUTPATIENT
Start: 2023-01-01 | End: 2023-01-01

## 2023-01-01 RX ORDER — HEPARIN SODIUM (PORCINE) LOCK FLUSH IV SOLN 100 UNIT/ML 100 UNIT/ML
5-10 SOLUTION INTRAVENOUS
Status: DISCONTINUED | OUTPATIENT
Start: 2023-01-01 | End: 2023-01-01 | Stop reason: HOSPADM

## 2023-01-01 RX ORDER — MEROPENEM 1 G/1
1 INJECTION, POWDER, FOR SOLUTION INTRAVENOUS EVERY 8 HOURS SCHEDULED
Status: DISCONTINUED | OUTPATIENT
Start: 2023-01-01 | End: 2023-01-01 | Stop reason: HOSPADM

## 2023-01-01 RX ORDER — LISINOPRIL 2.5 MG/1
5 TABLET ORAL DAILY
Status: DISCONTINUED | OUTPATIENT
Start: 2023-01-01 | End: 2023-01-01

## 2023-01-01 RX ORDER — LATANOPROST 50 UG/ML
1 SOLUTION/ DROPS OPHTHALMIC
COMMUNITY
Start: 2023-04-12

## 2023-01-01 RX ORDER — ACETAMINOPHEN 325 MG/1
650 TABLET ORAL EVERY 6 HOURS PRN
Status: DISCONTINUED | OUTPATIENT
Start: 2023-01-01 | End: 2023-01-01 | Stop reason: HOSPADM

## 2023-01-01 RX ORDER — AZITHROMYCIN 250 MG/1
TABLET, FILM COATED ORAL
Qty: 6 TABLET | Refills: 0 | Status: SHIPPED | OUTPATIENT
Start: 2023-01-01 | End: 2023-01-01

## 2023-01-01 RX ORDER — METOPROLOL SUCCINATE 50 MG/1
50 TABLET, EXTENDED RELEASE ORAL DAILY
Status: DISCONTINUED | OUTPATIENT
Start: 2023-01-01 | End: 2023-01-01 | Stop reason: HOSPADM

## 2023-01-01 RX ORDER — SODIUM CHLORIDE 9 MG/ML
INJECTION, SOLUTION INTRAVENOUS CONTINUOUS
Status: DISCONTINUED | OUTPATIENT
Start: 2023-01-01 | End: 2023-01-01 | Stop reason: HOSPADM

## 2023-01-01 RX ORDER — MEMANTINE HYDROCHLORIDE 5 MG/1
5 TABLET ORAL 2 TIMES DAILY
Status: DISCONTINUED | OUTPATIENT
Start: 2023-01-01 | End: 2023-01-01 | Stop reason: HOSPADM

## 2023-01-01 RX ORDER — VANCOMYCIN HYDROCHLORIDE 1 G/200ML
1000 INJECTION, SOLUTION INTRAVENOUS EVERY 12 HOURS
Status: DISCONTINUED | OUTPATIENT
Start: 2023-01-01 | End: 2023-01-01

## 2023-01-01 RX ORDER — METOPROLOL SUCCINATE 50 MG/1
1 TABLET, EXTENDED RELEASE ORAL DAILY
COMMUNITY
Start: 2023-01-01

## 2023-01-01 RX ORDER — DONEPEZIL HYDROCHLORIDE 5 MG/1
10 TABLET, FILM COATED ORAL AT BEDTIME
Status: DISCONTINUED | OUTPATIENT
Start: 2023-01-01 | End: 2023-01-01 | Stop reason: HOSPADM

## 2023-01-01 RX ORDER — MEROPENEM 1 G/1
1 INJECTION, POWDER, FOR SOLUTION INTRAVENOUS EVERY 8 HOURS
DISCHARGE
Start: 2023-01-01

## 2023-01-01 RX ORDER — SODIUM CHLORIDE 9 MG/ML
10 INJECTION, SOLUTION INTRAVENOUS CONTINUOUS
DISCHARGE
Start: 2023-01-01

## 2023-01-01 RX ORDER — ATORVASTATIN CALCIUM 40 MG/1
80 TABLET, FILM COATED ORAL DAILY
Status: DISCONTINUED | OUTPATIENT
Start: 2023-01-01 | End: 2023-01-01 | Stop reason: HOSPADM

## 2023-01-01 RX ORDER — TIMOLOL MALEATE 2.5 MG/ML
1 SOLUTION/ DROPS OPHTHALMIC EVERY MORNING
Status: DISCONTINUED | OUTPATIENT
Start: 2023-01-01 | End: 2023-01-01 | Stop reason: HOSPADM

## 2023-01-01 RX ORDER — ENOXAPARIN SODIUM 100 MG/ML
40 INJECTION SUBCUTANEOUS EVERY 24 HOURS
DISCHARGE
Start: 2023-01-01

## 2023-01-01 RX ORDER — CHOLESTYRAMINE 4 G/9G
4 POWDER, FOR SUSPENSION ORAL 2 TIMES DAILY
Status: DISCONTINUED | OUTPATIENT
Start: 2023-01-01 | End: 2023-01-01

## 2023-01-01 RX ORDER — CHOLESTYRAMINE LIGHT 4 G/5.7G
4 POWDER, FOR SUSPENSION ORAL 2 TIMES DAILY
Status: DISCONTINUED | OUTPATIENT
Start: 2023-01-01 | End: 2023-01-01 | Stop reason: HOSPADM

## 2023-01-01 RX ORDER — TIMOLOL MALEATE 2.5 MG/ML
1 SOLUTION/ DROPS OPHTHALMIC EVERY MORNING
COMMUNITY
Start: 2023-06-16

## 2023-01-01 RX ORDER — ACETAMINOPHEN 650 MG/1
650 SUPPOSITORY RECTAL EVERY 6 HOURS PRN
Status: DISCONTINUED | OUTPATIENT
Start: 2023-01-01 | End: 2023-01-01 | Stop reason: HOSPADM

## 2023-01-01 RX ORDER — PIPERACILLIN SODIUM, TAZOBACTAM SODIUM 3; .375 G/15ML; G/15ML
3.38 INJECTION, POWDER, LYOPHILIZED, FOR SOLUTION INTRAVENOUS EVERY 6 HOURS
Status: DISCONTINUED | OUTPATIENT
Start: 2023-01-01 | End: 2023-01-01

## 2023-01-01 RX ORDER — ENOXAPARIN SODIUM 100 MG/ML
40 INJECTION SUBCUTANEOUS EVERY 24 HOURS
Status: DISCONTINUED | OUTPATIENT
Start: 2023-01-01 | End: 2023-01-01 | Stop reason: HOSPADM

## 2023-01-01 RX ORDER — GUAIFENESIN 600 MG/1
600 TABLET, EXTENDED RELEASE ORAL DAILY
Status: ON HOLD | COMMUNITY
End: 2023-01-01

## 2023-01-01 RX ORDER — HEPARIN SODIUM,PORCINE 10 UNIT/ML
5-10 VIAL (ML) INTRAVENOUS EVERY 24 HOURS
Status: DISCONTINUED | OUTPATIENT
Start: 2023-01-01 | End: 2023-01-01 | Stop reason: HOSPADM

## 2023-01-01 RX ORDER — HEPARIN SODIUM,PORCINE 10 UNIT/ML
5-10 VIAL (ML) INTRAVENOUS
Status: DISCONTINUED | OUTPATIENT
Start: 2023-01-01 | End: 2023-01-01 | Stop reason: HOSPADM

## 2023-01-01 RX ORDER — LIDOCAINE HYDROCHLORIDE 10 MG/ML
10 INJECTION, SOLUTION EPIDURAL; INFILTRATION; INTRACAUDAL; PERINEURAL ONCE
Status: COMPLETED | OUTPATIENT
Start: 2023-01-01 | End: 2023-01-01

## 2023-01-01 RX ORDER — ASPIRIN 81 MG/1
81 TABLET ORAL DAILY
Status: DISCONTINUED | OUTPATIENT
Start: 2023-01-01 | End: 2023-01-01 | Stop reason: HOSPADM

## 2023-01-01 RX ORDER — AZITHROMYCIN 250 MG/1
500 TABLET, FILM COATED ORAL ONCE
Status: COMPLETED | OUTPATIENT
Start: 2023-01-01 | End: 2023-01-01

## 2023-01-01 RX ORDER — DOXYCYCLINE 100 MG/10ML
100 INJECTION, POWDER, LYOPHILIZED, FOR SOLUTION INTRAVENOUS ONCE
Status: COMPLETED | OUTPATIENT
Start: 2023-01-01 | End: 2023-01-01

## 2023-01-01 RX ORDER — FLUTICASONE FUROATE AND VILANTEROL 100; 25 UG/1; UG/1
1 POWDER RESPIRATORY (INHALATION) DAILY
Status: DISCONTINUED | OUTPATIENT
Start: 2023-01-01 | End: 2023-01-01 | Stop reason: HOSPADM

## 2023-01-01 RX ORDER — AZITHROMYCIN 250 MG/1
TABLET, FILM COATED ORAL
Qty: 6 TABLET | Refills: 0 | Status: ON HOLD | OUTPATIENT
Start: 2023-01-01 | End: 2023-01-01

## 2023-01-01 RX ORDER — MEMANTINE HYDROCHLORIDE 5 MG/1
5 TABLET ORAL 2 TIMES DAILY
COMMUNITY

## 2023-01-01 RX ORDER — AMPICILLIN AND SULBACTAM 2; 1 G/1; G/1
3 INJECTION, POWDER, FOR SOLUTION INTRAMUSCULAR; INTRAVENOUS ONCE
Status: COMPLETED | OUTPATIENT
Start: 2023-01-01 | End: 2023-01-01

## 2023-01-01 RX ADMIN — LATANOPROST 1 DROP: 50 SOLUTION OPHTHALMIC at 16:19

## 2023-01-01 RX ADMIN — SODIUM CHLORIDE, PRESERVATIVE FREE 5 ML: 5 INJECTION INTRAVENOUS at 05:24

## 2023-01-01 RX ADMIN — UMECLIDINIUM 1 PUFF: 62.5 AEROSOL, POWDER ORAL at 08:36

## 2023-01-01 RX ADMIN — UMECLIDINIUM 1 PUFF: 62.5 AEROSOL, POWDER ORAL at 08:16

## 2023-01-01 RX ADMIN — LATANOPROST 1 DROP: 50 SOLUTION OPHTHALMIC at 16:33

## 2023-01-01 RX ADMIN — MEMANTINE 5 MG: 5 TABLET ORAL at 08:00

## 2023-01-01 RX ADMIN — METOPROLOL SUCCINATE 50 MG: 50 TABLET, EXTENDED RELEASE ORAL at 08:18

## 2023-01-01 RX ADMIN — LATANOPROST 1 DROP: 50 SOLUTION OPHTHALMIC at 16:24

## 2023-01-01 RX ADMIN — SODIUM CHLORIDE 500 ML: 9 INJECTION, SOLUTION INTRAVENOUS at 23:11

## 2023-01-01 RX ADMIN — MEROPENEM 1 G: 1 INJECTION, POWDER, FOR SOLUTION INTRAVENOUS at 15:29

## 2023-01-01 RX ADMIN — UMECLIDINIUM 1 PUFF: 62.5 AEROSOL, POWDER ORAL at 08:01

## 2023-01-01 RX ADMIN — DONEPEZIL HYDROCHLORIDE 10 MG: 5 TABLET, FILM COATED ORAL at 21:35

## 2023-01-01 RX ADMIN — ENOXAPARIN SODIUM 40 MG: 40 INJECTION SUBCUTANEOUS at 08:10

## 2023-01-01 RX ADMIN — CHOLESTYRAMINE 4 G: 4 POWDER, FOR SUSPENSION ORAL at 10:13

## 2023-01-01 RX ADMIN — ENOXAPARIN SODIUM 40 MG: 40 INJECTION SUBCUTANEOUS at 08:32

## 2023-01-01 RX ADMIN — PIPERACILLIN AND TAZOBACTAM 3.38 G: 3; .375 INJECTION, POWDER, FOR SOLUTION INTRAVENOUS at 10:04

## 2023-01-01 RX ADMIN — FLUTICASONE FUROATE AND VILANTEROL TRIFENATATE 1 PUFF: 100; 25 POWDER RESPIRATORY (INHALATION) at 08:18

## 2023-01-01 RX ADMIN — PIPERACILLIN AND TAZOBACTAM 3.38 G: 3; .375 INJECTION, POWDER, FOR SOLUTION INTRAVENOUS at 03:09

## 2023-01-01 RX ADMIN — IOPAMIDOL 75 ML: 755 INJECTION, SOLUTION INTRAVENOUS at 06:00

## 2023-01-01 RX ADMIN — ACETAMINOPHEN 650 MG: 325 TABLET, FILM COATED ORAL at 23:25

## 2023-01-01 RX ADMIN — PIPERACILLIN AND TAZOBACTAM 3.38 G: 3; .375 INJECTION, POWDER, FOR SOLUTION INTRAVENOUS at 16:19

## 2023-01-01 RX ADMIN — TIMOLOL MALEATE 1 DROP: 2.5 SOLUTION OPHTHALMIC at 08:19

## 2023-01-01 RX ADMIN — ASPIRIN 81 MG: 81 TABLET ORAL at 08:31

## 2023-01-01 RX ADMIN — UMECLIDINIUM 1 PUFF: 62.5 AEROSOL, POWDER ORAL at 07:49

## 2023-01-01 RX ADMIN — METOPROLOL SUCCINATE 50 MG: 50 TABLET, EXTENDED RELEASE ORAL at 08:00

## 2023-01-01 RX ADMIN — CHOLESTYRAMINE 4 G: 4 POWDER, FOR SUSPENSION ORAL at 20:35

## 2023-01-01 RX ADMIN — MEMANTINE 5 MG: 5 TABLET ORAL at 21:15

## 2023-01-01 RX ADMIN — PIPERACILLIN AND TAZOBACTAM 3.38 G: 3; .375 INJECTION, POWDER, FOR SOLUTION INTRAVENOUS at 15:50

## 2023-01-01 RX ADMIN — LISINOPRIL 5 MG: 2.5 TABLET ORAL at 10:11

## 2023-01-01 RX ADMIN — ACETAMINOPHEN 650 MG: 325 TABLET, FILM COATED ORAL at 04:51

## 2023-01-01 RX ADMIN — SODIUM CHLORIDE, PRESERVATIVE FREE 5 ML: 5 INJECTION INTRAVENOUS at 05:20

## 2023-01-01 RX ADMIN — ACETAMINOPHEN 650 MG: 325 TABLET, FILM COATED ORAL at 13:20

## 2023-01-01 RX ADMIN — ENOXAPARIN SODIUM 40 MG: 40 INJECTION SUBCUTANEOUS at 08:16

## 2023-01-01 RX ADMIN — UMECLIDINIUM 1 PUFF: 62.5 AEROSOL, POWDER ORAL at 10:04

## 2023-01-01 RX ADMIN — VANCOMYCIN HYDROCHLORIDE 1500 MG: 1 INJECTION, POWDER, LYOPHILIZED, FOR SOLUTION INTRAVENOUS at 02:09

## 2023-01-01 RX ADMIN — TIMOLOL MALEATE 1 DROP: 2.5 SOLUTION OPHTHALMIC at 08:11

## 2023-01-01 RX ADMIN — MEMANTINE 5 MG: 5 TABLET ORAL at 08:35

## 2023-01-01 RX ADMIN — DONEPEZIL HYDROCHLORIDE 10 MG: 5 TABLET, FILM COATED ORAL at 21:58

## 2023-01-01 RX ADMIN — ATORVASTATIN CALCIUM 80 MG: 40 TABLET, FILM COATED ORAL at 21:15

## 2023-01-01 RX ADMIN — CHOLESTYRAMINE 4 G: 4 POWDER, FOR SUSPENSION ORAL at 21:58

## 2023-01-01 RX ADMIN — AMPICILLIN SODIUM AND SULBACTAM SODIUM 3 G: 2; 1 INJECTION, POWDER, FOR SOLUTION INTRAMUSCULAR; INTRAVENOUS at 08:09

## 2023-01-01 RX ADMIN — PIPERACILLIN AND TAZOBACTAM 3.38 G: 3; .375 INJECTION, POWDER, FOR SOLUTION INTRAVENOUS at 03:56

## 2023-01-01 RX ADMIN — PIPERACILLIN AND TAZOBACTAM 3.38 G: 3; .375 INJECTION, POWDER, FOR SOLUTION INTRAVENOUS at 16:32

## 2023-01-01 RX ADMIN — AMPICILLIN SODIUM AND SULBACTAM SODIUM 3 G: 2; 1 INJECTION, POWDER, FOR SOLUTION INTRAMUSCULAR; INTRAVENOUS at 09:14

## 2023-01-01 RX ADMIN — PIPERACILLIN AND TAZOBACTAM 3.38 G: 3; .375 INJECTION, POWDER, FOR SOLUTION INTRAVENOUS at 05:10

## 2023-01-01 RX ADMIN — UMECLIDINIUM 1 PUFF: 62.5 AEROSOL, POWDER ORAL at 07:54

## 2023-01-01 RX ADMIN — PIPERACILLIN AND TAZOBACTAM 3.38 G: 3; .375 INJECTION, POWDER, FOR SOLUTION INTRAVENOUS at 21:20

## 2023-01-01 RX ADMIN — CHOLESTYRAMINE 4 G: 4 POWDER, FOR SUSPENSION ORAL at 21:04

## 2023-01-01 RX ADMIN — ACETAMINOPHEN 650 MG: 325 TABLET, FILM COATED ORAL at 19:08

## 2023-01-01 RX ADMIN — ENOXAPARIN SODIUM 40 MG: 40 INJECTION SUBCUTANEOUS at 08:35

## 2023-01-01 RX ADMIN — MEMANTINE 5 MG: 5 TABLET ORAL at 08:10

## 2023-01-01 RX ADMIN — SODIUM CHLORIDE: 9 INJECTION, SOLUTION INTRAVENOUS at 21:06

## 2023-01-01 RX ADMIN — AMPICILLIN SODIUM AND SULBACTAM SODIUM 3 G: 2; 1 INJECTION, POWDER, FOR SOLUTION INTRAMUSCULAR; INTRAVENOUS at 10:04

## 2023-01-01 RX ADMIN — CHOLESTYRAMINE 4 G: 4 POWDER, FOR SUSPENSION ORAL at 10:17

## 2023-01-01 RX ADMIN — CHOLESTYRAMINE 4 G: 4 POWDER, FOR SUSPENSION ORAL at 22:58

## 2023-01-01 RX ADMIN — FLUTICASONE FUROATE AND VILANTEROL TRIFENATATE 1 PUFF: 100; 25 POWDER RESPIRATORY (INHALATION) at 10:04

## 2023-01-01 RX ADMIN — ASPIRIN 81 MG: 81 TABLET ORAL at 09:02

## 2023-01-01 RX ADMIN — AMPICILLIN SODIUM AND SULBACTAM SODIUM 3 G: 2; 1 INJECTION, POWDER, FOR SOLUTION INTRAMUSCULAR; INTRAVENOUS at 16:15

## 2023-01-01 RX ADMIN — PIPERACILLIN AND TAZOBACTAM 3.38 G: 3; .375 INJECTION, POWDER, FOR SOLUTION INTRAVENOUS at 09:06

## 2023-01-01 RX ADMIN — ASPIRIN 81 MG: 81 TABLET ORAL at 08:18

## 2023-01-01 RX ADMIN — FLUTICASONE FUROATE AND VILANTEROL TRIFENATATE 1 PUFF: 100; 25 POWDER RESPIRATORY (INHALATION) at 08:02

## 2023-01-01 RX ADMIN — FLUTICASONE FUROATE AND VILANTEROL TRIFENATATE 1 PUFF: 100; 25 POWDER RESPIRATORY (INHALATION) at 08:12

## 2023-01-01 RX ADMIN — Medication 5 ML: at 19:37

## 2023-01-01 RX ADMIN — VANCOMYCIN HYDROCHLORIDE 1000 MG: 1 INJECTION, SOLUTION INTRAVENOUS at 02:29

## 2023-01-01 RX ADMIN — CHOLESTYRAMINE 4 G: 4 POWDER, FOR SUSPENSION ORAL at 09:19

## 2023-01-01 RX ADMIN — IOPAMIDOL 81 ML: 755 INJECTION, SOLUTION INTRAVENOUS at 13:10

## 2023-01-01 RX ADMIN — PIPERACILLIN AND TAZOBACTAM 3.38 G: 3; .375 INJECTION, POWDER, FOR SOLUTION INTRAVENOUS at 04:36

## 2023-01-01 RX ADMIN — FUROSEMIDE 20 MG: 10 INJECTION, SOLUTION INTRAVENOUS at 12:13

## 2023-01-01 RX ADMIN — CHOLESTYRAMINE 4 G: 4 POWDER, FOR SUSPENSION ORAL at 09:14

## 2023-01-01 RX ADMIN — ENOXAPARIN SODIUM 40 MG: 40 INJECTION SUBCUTANEOUS at 08:00

## 2023-01-01 RX ADMIN — DONEPEZIL HYDROCHLORIDE 10 MG: 5 TABLET, FILM COATED ORAL at 20:43

## 2023-01-01 RX ADMIN — CHOLESTYRAMINE 4 G: 4 POWDER, FOR SUSPENSION ORAL at 10:05

## 2023-01-01 RX ADMIN — MEMANTINE 5 MG: 5 TABLET ORAL at 21:35

## 2023-01-01 RX ADMIN — CHOLESTYRAMINE 4 G: 4 POWDER, FOR SUSPENSION ORAL at 21:45

## 2023-01-01 RX ADMIN — METOPROLOL SUCCINATE 50 MG: 50 TABLET, EXTENDED RELEASE ORAL at 08:35

## 2023-01-01 RX ADMIN — PIPERACILLIN AND TAZOBACTAM 3.38 G: 3; .375 INJECTION, POWDER, FOR SOLUTION INTRAVENOUS at 21:35

## 2023-01-01 RX ADMIN — LATANOPROST 1 DROP: 50 SOLUTION OPHTHALMIC at 17:22

## 2023-01-01 RX ADMIN — AMOXICILLIN AND CLAVULANATE POTASSIUM 1 TABLET: 875; 125 TABLET, COATED ORAL at 21:08

## 2023-01-01 RX ADMIN — PIPERACILLIN AND TAZOBACTAM 3.38 G: 3; .375 INJECTION, POWDER, FOR SOLUTION INTRAVENOUS at 10:12

## 2023-01-01 RX ADMIN — LATANOPROST 1 DROP: 50 SOLUTION OPHTHALMIC at 17:00

## 2023-01-01 RX ADMIN — LIDOCAINE HYDROCHLORIDE 10 ML: 10 INJECTION, SOLUTION EPIDURAL; INFILTRATION; INTRACAUDAL; PERINEURAL at 09:57

## 2023-01-01 RX ADMIN — SODIUM CHLORIDE 75 ML: 9 INJECTION, SOLUTION INTRAVENOUS at 06:01

## 2023-01-01 RX ADMIN — DONEPEZIL HYDROCHLORIDE 10 MG: 5 TABLET, FILM COATED ORAL at 21:07

## 2023-01-01 RX ADMIN — CHOLESTYRAMINE 4 G: 4 POWDER, FOR SUSPENSION ORAL at 10:42

## 2023-01-01 RX ADMIN — TIMOLOL MALEATE 1 DROP: 2.5 SOLUTION OPHTHALMIC at 10:07

## 2023-01-01 RX ADMIN — SODIUM CHLORIDE 500 ML: 9 INJECTION, SOLUTION INTRAVENOUS at 21:43

## 2023-01-01 RX ADMIN — TIMOLOL MALEATE 1 DROP: 2.5 SOLUTION OPHTHALMIC at 08:36

## 2023-01-01 RX ADMIN — PIPERACILLIN AND TAZOBACTAM 3.38 G: 3; .375 INJECTION, POWDER, FOR SOLUTION INTRAVENOUS at 17:16

## 2023-01-01 RX ADMIN — MEMANTINE 5 MG: 5 TABLET ORAL at 08:18

## 2023-01-01 RX ADMIN — DONEPEZIL HYDROCHLORIDE 10 MG: 5 TABLET, FILM COATED ORAL at 20:35

## 2023-01-01 RX ADMIN — VANCOMYCIN HYDROCHLORIDE 1500 MG: 1 INJECTION, POWDER, LYOPHILIZED, FOR SOLUTION INTRAVENOUS at 03:29

## 2023-01-01 RX ADMIN — METOPROLOL SUCCINATE 50 MG: 50 TABLET, EXTENDED RELEASE ORAL at 08:32

## 2023-01-01 RX ADMIN — AMPICILLIN SODIUM AND SULBACTAM SODIUM 3 G: 2; 1 INJECTION, POWDER, FOR SOLUTION INTRAMUSCULAR; INTRAVENOUS at 21:02

## 2023-01-01 RX ADMIN — MEMANTINE 5 MG: 5 TABLET ORAL at 20:35

## 2023-01-01 RX ADMIN — ATORVASTATIN CALCIUM 80 MG: 40 TABLET, FILM COATED ORAL at 21:10

## 2023-01-01 RX ADMIN — UMECLIDINIUM 1 PUFF: 62.5 AEROSOL, POWDER ORAL at 08:09

## 2023-01-01 RX ADMIN — FLUTICASONE FUROATE AND VILANTEROL TRIFENATATE 1 PUFF: 100; 25 POWDER RESPIRATORY (INHALATION) at 08:37

## 2023-01-01 RX ADMIN — FLUTICASONE FUROATE AND VILANTEROL TRIFENATATE 1 PUFF: 100; 25 POWDER RESPIRATORY (INHALATION) at 08:33

## 2023-01-01 RX ADMIN — LATANOPROST 1 DROP: 50 SOLUTION OPHTHALMIC at 16:50

## 2023-01-01 RX ADMIN — MEMANTINE 5 MG: 5 TABLET ORAL at 20:43

## 2023-01-01 RX ADMIN — SODIUM CHLORIDE: 9 INJECTION, SOLUTION INTRAVENOUS at 21:35

## 2023-01-01 RX ADMIN — AMPICILLIN SODIUM AND SULBACTAM SODIUM 3 G: 2; 1 INJECTION, POWDER, FOR SOLUTION INTRAMUSCULAR; INTRAVENOUS at 01:51

## 2023-01-01 RX ADMIN — LISINOPRIL 5 MG: 2.5 TABLET ORAL at 09:01

## 2023-01-01 RX ADMIN — VANCOMYCIN HYDROCHLORIDE 1500 MG: 1 INJECTION, POWDER, LYOPHILIZED, FOR SOLUTION INTRAVENOUS at 01:58

## 2023-01-01 RX ADMIN — MEMANTINE 5 MG: 5 TABLET ORAL at 10:28

## 2023-01-01 RX ADMIN — PIPERACILLIN AND TAZOBACTAM 3.38 G: 3; .375 INJECTION, POWDER, FOR SOLUTION INTRAVENOUS at 10:28

## 2023-01-01 RX ADMIN — AMPICILLIN SODIUM AND SULBACTAM SODIUM 3 G: 2; 1 INJECTION, POWDER, FOR SOLUTION INTRAMUSCULAR; INTRAVENOUS at 13:25

## 2023-01-01 RX ADMIN — SODIUM CHLORIDE, PRESERVATIVE FREE 5 ML: 5 INJECTION INTRAVENOUS at 05:57

## 2023-01-01 RX ADMIN — ATORVASTATIN CALCIUM 80 MG: 40 TABLET, FILM COATED ORAL at 21:35

## 2023-01-01 RX ADMIN — ASPIRIN 81 MG: 81 TABLET ORAL at 08:09

## 2023-01-01 RX ADMIN — TIMOLOL MALEATE 1 DROP: 2.5 SOLUTION OPHTHALMIC at 08:17

## 2023-01-01 RX ADMIN — ATORVASTATIN CALCIUM 80 MG: 40 TABLET, FILM COATED ORAL at 09:01

## 2023-01-01 RX ADMIN — MEMANTINE 5 MG: 5 TABLET ORAL at 20:22

## 2023-01-01 RX ADMIN — DONEPEZIL HYDROCHLORIDE 10 MG: 5 TABLET, FILM COATED ORAL at 21:15

## 2023-01-01 RX ADMIN — CHOLESTYRAMINE 4 G: 4 POWDER, FOR SUSPENSION ORAL at 22:06

## 2023-01-01 RX ADMIN — METOPROLOL SUCCINATE 50 MG: 50 TABLET, EXTENDED RELEASE ORAL at 10:28

## 2023-01-01 RX ADMIN — AMPICILLIN SODIUM AND SULBACTAM SODIUM 3 G: 2; 1 INJECTION, POWDER, FOR SOLUTION INTRAMUSCULAR; INTRAVENOUS at 00:08

## 2023-01-01 RX ADMIN — ATORVASTATIN CALCIUM 80 MG: 40 TABLET, FILM COATED ORAL at 20:22

## 2023-01-01 RX ADMIN — AMPICILLIN SODIUM AND SULBACTAM SODIUM 3 G: 2; 1 INJECTION, POWDER, FOR SOLUTION INTRAMUSCULAR; INTRAVENOUS at 19:09

## 2023-01-01 RX ADMIN — FLUTICASONE FUROATE AND VILANTEROL TRIFENATATE 1 PUFF: 100; 25 POWDER RESPIRATORY (INHALATION) at 08:11

## 2023-01-01 RX ADMIN — FLUTICASONE FUROATE AND VILANTEROL TRIFENATATE 1 PUFF: 100; 25 POWDER RESPIRATORY (INHALATION) at 14:58

## 2023-01-01 RX ADMIN — DONEPEZIL HYDROCHLORIDE 10 MG: 5 TABLET, FILM COATED ORAL at 20:22

## 2023-01-01 RX ADMIN — SODIUM CHLORIDE, PRESERVATIVE FREE 5 ML: 5 INJECTION INTRAVENOUS at 05:54

## 2023-01-01 RX ADMIN — ATORVASTATIN CALCIUM 80 MG: 40 TABLET, FILM COATED ORAL at 20:35

## 2023-01-01 RX ADMIN — DOXYCYCLINE 100 MG: 100 INJECTION, POWDER, LYOPHILIZED, FOR SOLUTION INTRAVENOUS at 23:12

## 2023-01-01 RX ADMIN — Medication 5 ML: at 00:20

## 2023-01-01 RX ADMIN — LATANOPROST 1 DROP: 50 SOLUTION OPHTHALMIC at 16:09

## 2023-01-01 RX ADMIN — MEMANTINE 5 MG: 5 TABLET ORAL at 21:07

## 2023-01-01 RX ADMIN — ATORVASTATIN CALCIUM 80 MG: 40 TABLET, FILM COATED ORAL at 20:43

## 2023-01-01 RX ADMIN — Medication 5 ML: at 12:13

## 2023-01-01 RX ADMIN — ENOXAPARIN SODIUM 40 MG: 40 INJECTION SUBCUTANEOUS at 10:03

## 2023-01-01 RX ADMIN — Medication 5 ML: at 10:42

## 2023-01-01 RX ADMIN — METOPROLOL SUCCINATE 50 MG: 50 TABLET, EXTENDED RELEASE ORAL at 08:10

## 2023-01-01 RX ADMIN — PIPERACILLIN AND TAZOBACTAM 3.38 G: 3; .375 INJECTION, POWDER, FOR SOLUTION INTRAVENOUS at 21:59

## 2023-01-01 RX ADMIN — CHOLESTYRAMINE 4 G: 4 POWDER, FOR SUSPENSION ORAL at 10:00

## 2023-01-01 RX ADMIN — ASPIRIN 81 MG: 81 TABLET ORAL at 08:00

## 2023-01-01 RX ADMIN — ASPIRIN 81 MG: 81 TABLET ORAL at 10:28

## 2023-01-01 RX ADMIN — SODIUM CHLORIDE, PRESERVATIVE FREE 5 ML: 5 INJECTION INTRAVENOUS at 05:29

## 2023-01-01 RX ADMIN — TIMOLOL MALEATE 1 DROP: 2.5 SOLUTION OPHTHALMIC at 08:33

## 2023-01-01 RX ADMIN — ASPIRIN 81 MG: 81 TABLET ORAL at 08:35

## 2023-01-01 RX ADMIN — SODIUM CHLORIDE 60 ML: 9 INJECTION, SOLUTION INTRAVENOUS at 13:10

## 2023-01-01 RX ADMIN — LATANOPROST 1 DROP: 50 SOLUTION OPHTHALMIC at 16:16

## 2023-01-01 RX ADMIN — AMOXICILLIN AND CLAVULANATE POTASSIUM 1 TABLET: 875; 125 TABLET, COATED ORAL at 13:49

## 2023-01-01 RX ADMIN — PIPERACILLIN AND TAZOBACTAM 3.38 G: 3; .375 INJECTION, POWDER, FOR SOLUTION INTRAVENOUS at 22:07

## 2023-01-01 RX ADMIN — CHOLESTYRAMINE 4 G: 4 POWDER, FOR SUSPENSION ORAL at 21:15

## 2023-01-01 RX ADMIN — Medication 5 ML: at 07:54

## 2023-01-01 RX ADMIN — ACETAMINOPHEN 650 MG: 325 TABLET, FILM COATED ORAL at 15:32

## 2023-01-01 RX ADMIN — MEROPENEM 1 G: 1 INJECTION, POWDER, FOR SOLUTION INTRAVENOUS at 07:43

## 2023-01-01 RX ADMIN — PIPERACILLIN AND TAZOBACTAM 3.38 G: 3; .375 INJECTION, POWDER, FOR SOLUTION INTRAVENOUS at 04:04

## 2023-01-01 RX ADMIN — METOPROLOL SUCCINATE 100 MG: 100 TABLET, EXTENDED RELEASE ORAL at 09:02

## 2023-01-01 RX ADMIN — AZITHROMYCIN DIHYDRATE 500 MG: 250 TABLET, FILM COATED ORAL at 13:49

## 2023-01-01 RX ADMIN — TIMOLOL MALEATE 1 DROP: 2.5 SOLUTION OPHTHALMIC at 08:01

## 2023-01-01 RX ADMIN — Medication 5 ML: at 06:05

## 2023-01-01 RX ADMIN — MEMANTINE 5 MG: 5 TABLET ORAL at 08:32

## 2023-01-01 RX ADMIN — AMPICILLIN SODIUM AND SULBACTAM SODIUM 3 G: 2; 1 INJECTION, POWDER, FOR SOLUTION INTRAMUSCULAR; INTRAVENOUS at 03:52

## 2023-01-01 RX ADMIN — ENOXAPARIN SODIUM 40 MG: 40 INJECTION SUBCUTANEOUS at 08:18

## 2023-01-01 RX ADMIN — CHOLESTYRAMINE 4 G: 4 POWDER, FOR SUSPENSION ORAL at 10:27

## 2023-01-01 RX ADMIN — ENOXAPARIN SODIUM 40 MG: 40 INJECTION SUBCUTANEOUS at 09:01

## 2023-01-01 RX ADMIN — MEMANTINE 5 MG: 5 TABLET ORAL at 21:58

## 2023-01-01 RX ADMIN — IOPAMIDOL 80 ML: 755 INJECTION, SOLUTION INTRAVENOUS at 09:15

## 2023-01-01 RX ADMIN — UMECLIDINIUM 1 PUFF: 62.5 AEROSOL, POWDER ORAL at 14:58

## 2023-01-01 RX ADMIN — Medication 5 ML: at 09:51

## 2023-01-01 RX ADMIN — SODIUM CHLORIDE 60 ML: 9 INJECTION, SOLUTION INTRAVENOUS at 09:17

## 2023-01-01 ASSESSMENT — ACTIVITIES OF DAILY LIVING (ADL)
ADLS_ACUITY_SCORE: 20
ADLS_ACUITY_SCORE: 25
ADLS_ACUITY_SCORE: 31
ADLS_ACUITY_SCORE: 30
ADLS_ACUITY_SCORE: 24
ADLS_ACUITY_SCORE: 22
ADLS_ACUITY_SCORE: 27
ADLS_ACUITY_SCORE: 27
ADLS_ACUITY_SCORE: 25
ADLS_ACUITY_SCORE: 22
ADLS_ACUITY_SCORE: 20
ADLS_ACUITY_SCORE: 27
ADLS_ACUITY_SCORE: 24
ADLS_ACUITY_SCORE: 20
ADLS_ACUITY_SCORE: 24
ADLS_ACUITY_SCORE: 27
ADLS_ACUITY_SCORE: 25
WALKING_OR_CLIMBING_STAIRS_DIFFICULTY: NO
ADLS_ACUITY_SCORE: 35
ADLS_ACUITY_SCORE: 28
WEAR_GLASSES_OR_BLIND: OTHER (SEE COMMENTS)
ADLS_ACUITY_SCORE: 22
CONCENTRATING,_REMEMBERING_OR_MAKING_DECISIONS_DIFFICULTY: NO
ADLS_ACUITY_SCORE: 31
ADLS_ACUITY_SCORE: 25
ADLS_ACUITY_SCORE: 28
ADLS_ACUITY_SCORE: 30
ADLS_ACUITY_SCORE: 20
ADLS_ACUITY_SCORE: 24
ADLS_ACUITY_SCORE: 25
DOING_ERRANDS_INDEPENDENTLY_DIFFICULTY: NO
ADLS_ACUITY_SCORE: 25
ADLS_ACUITY_SCORE: 30
ADLS_ACUITY_SCORE: 25
TOILETING_ISSUES: NO
ADLS_ACUITY_SCORE: 20
ADLS_ACUITY_SCORE: 25
ADLS_ACUITY_SCORE: 25
ADLS_ACUITY_SCORE: 20
ADLS_ACUITY_SCORE: 22
ADLS_ACUITY_SCORE: 20
ADLS_ACUITY_SCORE: 22
ADLS_ACUITY_SCORE: 28
ADLS_ACUITY_SCORE: 26
ADLS_ACUITY_SCORE: 24
ADLS_ACUITY_SCORE: 20
ADLS_ACUITY_SCORE: 25
ADLS_ACUITY_SCORE: 30
ADLS_ACUITY_SCORE: 20
HEARING_DIFFICULTY_OR_DEAF: NO
DEPENDENT_IADLS:: TRANSPORTATION;MEAL PREPARATION;SHOPPING;LAUNDRY;COOKING;CLEANING
ADLS_ACUITY_SCORE: 25
ADLS_ACUITY_SCORE: 22
ADLS_ACUITY_SCORE: 25
ADLS_ACUITY_SCORE: 20
ADLS_ACUITY_SCORE: 22
ADLS_ACUITY_SCORE: 30
ADLS_ACUITY_SCORE: 26
DRESSING/BATHING_DIFFICULTY: NO
FALL_HISTORY_WITHIN_LAST_SIX_MONTHS: NO
ADLS_ACUITY_SCORE: 22
ADLS_ACUITY_SCORE: 25
ADLS_ACUITY_SCORE: 25
ADLS_ACUITY_SCORE: 20
ADLS_ACUITY_SCORE: 25
ADLS_ACUITY_SCORE: 31
ADLS_ACUITY_SCORE: 27
ADLS_ACUITY_SCORE: 30
ADLS_ACUITY_SCORE: 27
ADLS_ACUITY_SCORE: 22
ADLS_ACUITY_SCORE: 30
ADLS_ACUITY_SCORE: 24
ADLS_ACUITY_SCORE: 27
ADLS_ACUITY_SCORE: 25
ADLS_ACUITY_SCORE: 22
ADLS_ACUITY_SCORE: 24
ADLS_ACUITY_SCORE: 25
ADLS_ACUITY_SCORE: 31
ADLS_ACUITY_SCORE: 20
ADLS_ACUITY_SCORE: 25
ADLS_ACUITY_SCORE: 28
ADLS_ACUITY_SCORE: 26
CHANGE_IN_FUNCTIONAL_STATUS_SINCE_ONSET_OF_CURRENT_ILLNESS/INJURY: NO
ADLS_ACUITY_SCORE: 22
ADLS_ACUITY_SCORE: 20
ADLS_ACUITY_SCORE: 22
ADLS_ACUITY_SCORE: 20
ADLS_ACUITY_SCORE: 25
ADLS_ACUITY_SCORE: 22
ADLS_ACUITY_SCORE: 25
ADLS_ACUITY_SCORE: 30
ADLS_ACUITY_SCORE: 35
ADLS_ACUITY_SCORE: 25
ADLS_ACUITY_SCORE: 30
ADLS_ACUITY_SCORE: 30
ADLS_ACUITY_SCORE: 20
ADLS_ACUITY_SCORE: 30
ADLS_ACUITY_SCORE: 25
ADLS_ACUITY_SCORE: 35
DIFFICULTY_EATING/SWALLOWING: NO

## 2023-05-08 DIAGNOSIS — C93.10 CHRONIC MONOCYTOID LEUKEMIA (H): Primary | ICD-10-CM

## 2023-05-11 ENCOUNTER — LAB (OUTPATIENT)
Dept: LAB | Facility: CLINIC | Age: 83
End: 2023-05-11
Payer: COMMERCIAL

## 2023-05-11 DIAGNOSIS — C93.10 CHRONIC MONOCYTOID LEUKEMIA (H): ICD-10-CM

## 2023-05-11 LAB
BASOPHILS # BLD MANUAL: 0 10E3/UL (ref 0–0.2)
BASOPHILS NFR BLD MANUAL: 0 %
EOSINOPHIL # BLD MANUAL: 0.2 10E3/UL (ref 0–0.7)
EOSINOPHIL NFR BLD MANUAL: 3 %
ERYTHROCYTE [DISTWIDTH] IN BLOOD BY AUTOMATED COUNT: 17.2 % (ref 10–15)
HCT VFR BLD AUTO: 36.3 % (ref 40–53)
HGB BLD-MCNC: 12 G/DL (ref 13.3–17.7)
LYMPHOCYTES # BLD MANUAL: 0.3 10E3/UL (ref 0.8–5.3)
LYMPHOCYTES NFR BLD MANUAL: 5 %
MCH RBC QN AUTO: 30.5 PG (ref 26.5–33)
MCHC RBC AUTO-ENTMCNC: 33.1 G/DL (ref 31.5–36.5)
MCV RBC AUTO: 92 FL (ref 78–100)
MONOCYTES # BLD MANUAL: 1.8 10E3/UL (ref 0–1.3)
MONOCYTES NFR BLD MANUAL: 28 %
NEUTROPHILS # BLD MANUAL: 4 10E3/UL (ref 1.6–8.3)
NEUTROPHILS NFR BLD MANUAL: 64 %
PLAT MORPH BLD: ABNORMAL
PLATELET # BLD AUTO: 31 10E3/UL (ref 150–450)
RBC # BLD AUTO: 3.94 10E6/UL (ref 4.4–5.9)
RBC MORPH BLD: ABNORMAL
WBC # BLD AUTO: 6.3 10E3/UL (ref 4–11)

## 2023-05-11 PROCEDURE — 85027 COMPLETE CBC AUTOMATED: CPT

## 2023-05-11 PROCEDURE — 36415 COLL VENOUS BLD VENIPUNCTURE: CPT

## 2023-05-11 PROCEDURE — 85007 BL SMEAR W/DIFF WBC COUNT: CPT

## 2023-06-07 DIAGNOSIS — C34.90 LUNG CANCER (H): ICD-10-CM

## 2023-06-07 DIAGNOSIS — C34.12 PANCOASTS SYNDROME, LEFT (H): Primary | ICD-10-CM

## 2023-06-13 ENCOUNTER — LAB (OUTPATIENT)
Dept: LAB | Facility: CLINIC | Age: 83
End: 2023-06-13
Payer: COMMERCIAL

## 2023-06-13 DIAGNOSIS — C34.12 PANCOASTS SYNDROME, LEFT (H): ICD-10-CM

## 2023-06-13 DIAGNOSIS — C34.90 LUNG CANCER (H): ICD-10-CM

## 2023-06-13 LAB
BASOPHILS # BLD MANUAL: 0.1 10E3/UL (ref 0–0.2)
BASOPHILS NFR BLD MANUAL: 1 %
EOSINOPHIL # BLD MANUAL: 0.1 10E3/UL (ref 0–0.7)
EOSINOPHIL NFR BLD MANUAL: 1 %
ERYTHROCYTE [DISTWIDTH] IN BLOOD BY AUTOMATED COUNT: 16.4 % (ref 10–15)
HCT VFR BLD AUTO: 36.8 % (ref 40–53)
HGB BLD-MCNC: 12.3 G/DL (ref 13.3–17.7)
LYMPHOCYTES # BLD MANUAL: 2.2 10E3/UL (ref 0.8–5.3)
LYMPHOCYTES NFR BLD MANUAL: 27 %
MCH RBC QN AUTO: 31.4 PG (ref 26.5–33)
MCHC RBC AUTO-ENTMCNC: 33.4 G/DL (ref 31.5–36.5)
MCV RBC AUTO: 94 FL (ref 78–100)
METAMYELOCYTES # BLD MANUAL: 0.1 10E3/UL
METAMYELOCYTES NFR BLD MANUAL: 1 %
MONOCYTES # BLD MANUAL: 1.8 10E3/UL (ref 0–1.3)
MONOCYTES NFR BLD MANUAL: 23 %
MYELOCYTES # BLD MANUAL: 0.2 10E3/UL
MYELOCYTES NFR BLD MANUAL: 3 %
NEUTROPHILS # BLD MANUAL: 3.5 10E3/UL (ref 1.6–8.3)
NEUTROPHILS NFR BLD MANUAL: 44 %
PLAT MORPH BLD: ABNORMAL
PLATELET # BLD AUTO: 46 10E3/UL (ref 150–450)
RBC # BLD AUTO: 3.92 10E6/UL (ref 4.4–5.9)
RBC MORPH BLD: ABNORMAL
WBC # BLD AUTO: 8 10E3/UL (ref 4–11)

## 2023-06-13 PROCEDURE — 85027 COMPLETE CBC AUTOMATED: CPT

## 2023-06-13 PROCEDURE — 85007 BL SMEAR W/DIFF WBC COUNT: CPT

## 2023-06-13 PROCEDURE — 36415 COLL VENOUS BLD VENIPUNCTURE: CPT

## 2023-06-20 ENCOUNTER — LAB (OUTPATIENT)
Dept: LAB | Facility: CLINIC | Age: 83
End: 2023-06-20
Payer: COMMERCIAL

## 2023-06-20 DIAGNOSIS — C34.12 PANCOASTS SYNDROME, LEFT (H): Primary | ICD-10-CM

## 2023-06-20 LAB
BASOPHILS # BLD MANUAL: 0 10E3/UL (ref 0–0.2)
BASOPHILS NFR BLD MANUAL: 0 %
EOSINOPHIL # BLD MANUAL: 0 10E3/UL (ref 0–0.7)
EOSINOPHIL NFR BLD MANUAL: 0 %
ERYTHROCYTE [DISTWIDTH] IN BLOOD BY AUTOMATED COUNT: 15.9 % (ref 10–15)
HCT VFR BLD AUTO: 40.3 % (ref 40–53)
HGB BLD-MCNC: 13.1 G/DL (ref 13.3–17.7)
LYMPHOCYTES # BLD MANUAL: 4.2 10E3/UL (ref 0.8–5.3)
LYMPHOCYTES NFR BLD MANUAL: 30 %
MCH RBC QN AUTO: 31.3 PG (ref 26.5–33)
MCHC RBC AUTO-ENTMCNC: 32.5 G/DL (ref 31.5–36.5)
MCV RBC AUTO: 96 FL (ref 78–100)
MONOCYTES # BLD MANUAL: 2.9 10E3/UL (ref 0–1.3)
MONOCYTES NFR BLD MANUAL: 21 %
NEUTROPHILS # BLD MANUAL: 6.8 10E3/UL (ref 1.6–8.3)
NEUTROPHILS NFR BLD MANUAL: 49 %
PLAT MORPH BLD: ABNORMAL
PLATELET # BLD AUTO: 52 10E3/UL (ref 150–450)
RBC # BLD AUTO: 4.19 10E6/UL (ref 4.4–5.9)
RBC MORPH BLD: ABNORMAL
WBC # BLD AUTO: 13.9 10E3/UL (ref 4–11)

## 2023-06-20 PROCEDURE — 85007 BL SMEAR W/DIFF WBC COUNT: CPT

## 2023-06-20 PROCEDURE — 36415 COLL VENOUS BLD VENIPUNCTURE: CPT

## 2023-06-20 PROCEDURE — 85027 COMPLETE CBC AUTOMATED: CPT

## 2023-06-27 ENCOUNTER — LAB (OUTPATIENT)
Dept: LAB | Facility: CLINIC | Age: 83
End: 2023-06-27
Payer: COMMERCIAL

## 2023-06-27 DIAGNOSIS — C34.12 PANCOASTS SYNDROME, LEFT (H): Primary | ICD-10-CM

## 2023-06-27 LAB
BASOPHILS # BLD MANUAL: 0 10E3/UL (ref 0–0.2)
BASOPHILS NFR BLD MANUAL: 0 %
EOSINOPHIL # BLD MANUAL: 0 10E3/UL (ref 0–0.7)
EOSINOPHIL NFR BLD MANUAL: 0 %
ERYTHROCYTE [DISTWIDTH] IN BLOOD BY AUTOMATED COUNT: 15.3 % (ref 10–15)
HCT VFR BLD AUTO: 39.3 % (ref 40–53)
HGB BLD-MCNC: 13 G/DL (ref 13.3–17.7)
LYMPHOCYTES # BLD MANUAL: 2 10E3/UL (ref 0.8–5.3)
LYMPHOCYTES NFR BLD MANUAL: 16 %
MCH RBC QN AUTO: 31.2 PG (ref 26.5–33)
MCHC RBC AUTO-ENTMCNC: 33.1 G/DL (ref 31.5–36.5)
MCV RBC AUTO: 94 FL (ref 78–100)
MONOCYTES # BLD MANUAL: 5 10E3/UL (ref 0–1.3)
MONOCYTES NFR BLD MANUAL: 40 %
NEUTROPHILS # BLD MANUAL: 5.5 10E3/UL (ref 1.6–8.3)
NEUTROPHILS NFR BLD MANUAL: 44 %
PLAT MORPH BLD: ABNORMAL
PLATELET # BLD AUTO: 55 10E3/UL (ref 150–450)
RBC # BLD AUTO: 4.17 10E6/UL (ref 4.4–5.9)
RBC MORPH BLD: ABNORMAL
WBC # BLD AUTO: 12.5 10E3/UL (ref 4–11)

## 2023-06-27 PROCEDURE — 36415 COLL VENOUS BLD VENIPUNCTURE: CPT

## 2023-06-27 PROCEDURE — 85027 COMPLETE CBC AUTOMATED: CPT

## 2023-06-27 PROCEDURE — 85007 BL SMEAR W/DIFF WBC COUNT: CPT

## 2023-06-30 ENCOUNTER — MEDICAL CORRESPONDENCE (OUTPATIENT)
Dept: HEALTH INFORMATION MANAGEMENT | Facility: CLINIC | Age: 83
End: 2023-06-30
Payer: COMMERCIAL

## 2023-07-31 ENCOUNTER — LAB (OUTPATIENT)
Dept: LAB | Facility: CLINIC | Age: 83
End: 2023-07-31
Payer: COMMERCIAL

## 2023-07-31 DIAGNOSIS — C34.12 PANCOASTS SYNDROME, LEFT (H): Primary | ICD-10-CM

## 2023-07-31 LAB
ALBUMIN SERPL BCG-MCNC: 4 G/DL (ref 3.5–5.2)
ALP SERPL-CCNC: 121 U/L (ref 40–129)
ALT SERPL W P-5'-P-CCNC: 83 U/L (ref 0–70)
ANION GAP SERPL CALCULATED.3IONS-SCNC: 8 MMOL/L (ref 7–15)
AST SERPL W P-5'-P-CCNC: 35 U/L (ref 0–45)
BILIRUB SERPL-MCNC: 0.7 MG/DL
BUN SERPL-MCNC: 23.1 MG/DL (ref 8–23)
CALCIUM SERPL-MCNC: 9.3 MG/DL (ref 8.8–10.2)
CHLORIDE SERPL-SCNC: 102 MMOL/L (ref 98–107)
CREAT SERPL-MCNC: 0.91 MG/DL (ref 0.67–1.17)
DEPRECATED HCO3 PLAS-SCNC: 29 MMOL/L (ref 22–29)
FOLATE SERPL-MCNC: 32.2 NG/ML (ref 4.6–34.8)
GFR SERPL CREATININE-BSD FRML MDRD: 84 ML/MIN/1.73M2
GLUCOSE SERPL-MCNC: 133 MG/DL (ref 70–99)
POTASSIUM SERPL-SCNC: 4.1 MMOL/L (ref 3.4–5.3)
PROT SERPL-MCNC: 6.4 G/DL (ref 6.4–8.3)
SODIUM SERPL-SCNC: 139 MMOL/L (ref 136–145)
VIT B12 SERPL-MCNC: 1884 PG/ML (ref 232–1245)

## 2023-07-31 PROCEDURE — 80053 COMPREHEN METABOLIC PANEL: CPT

## 2023-07-31 PROCEDURE — 36415 COLL VENOUS BLD VENIPUNCTURE: CPT

## 2023-07-31 PROCEDURE — 82746 ASSAY OF FOLIC ACID SERUM: CPT

## 2023-07-31 PROCEDURE — 82607 VITAMIN B-12: CPT

## 2023-08-01 ENCOUNTER — TELEPHONE (OUTPATIENT)
Dept: INTERNAL MEDICINE | Facility: CLINIC | Age: 83
End: 2023-08-01
Payer: COMMERCIAL

## 2023-08-01 NOTE — TELEPHONE ENCOUNTER
Test Results    Contacts         Type Contact Phone/Fax    08/01/2023 01:21 PM CDT Phone (Incoming) JENNIFER OSWALD (Emergency Contact) [phone]            Who ordered the test:  [facility]    Type of test: Lab    Date of test:  7/31/2023    Where was the test performed:  [facility]    What are your questions/concerns?:  Wife would like to know if the results were sent over to [facility] yet and is requesting a call back.    Okay to leave a detailed message?: Yes at Cell number on file:    Telephone Information:   Mobile [phone]

## 2023-09-22 NOTE — DISCHARGE INSTRUCTIONS
You are found to have a left upper lobe pneumonia.  You were provided with Augmentin and azithromycin here as well as a prescription for this.  Please follow-up with your primary care in the next 1 to 2 days as well as your pulmonologist as scheduled.  If any symptoms of worsening fevers shortness of breath cough please return for reevaluation.

## 2023-09-22 NOTE — ED PROVIDER NOTES
History     Chief Complaint   Patient presents with    Shortness of Breath     HPI  Fernando Bermudez is a 83-year-old male presenting with shortness of breath.  Wife and daughter at bedside 6 pain for last 4 days patient is having increased work of breathing and coughing without any production.  They note that his heart rates been jumping up into the 110s to 120s which is abnormal for him.  He is otherwise been doing really well however feels last 4 days states that he has been worsening.  He did have 1 episode of not using his oxygen while going to [place] however put this back on and has been otherwise doing well with his appliance to oxygen use.  He has not required any more oxygen than his baseline requirements per pulmonology and has a follow-up on Wednesday for his immunotherapy for chemo with CT imaging of his chest abdomen pelvis coming up.  They deny any new onset of fevers headaches dizziness nausea or vomiting and has been otherwise eating and drinking appropriately has not had any abdominal pain diarrhea or dysuria.     He has a history of COPD and lung cancer (non-small cell on immunotherapy), Alzheimer's.  He seen his pulmonologist on 8/24/2023 and was recommended to continue with 2 L continuous oxygen with exertion keeping his saturation between 88 and 94.  Currently he is taking Incruse and albuterol as needed.    Of note patient was hospitalized on July 23 for acute shortness of breath secondary to autoimmune bronchitis versus AECOPD versus radiation pneumonitis and was placed on prednisone taper.  CT did show pleural effusions mild and mucous plugging with mild response to left upper lobe mass.    Allergies:  Allergies   Allergen Reactions    Niacin Unknown       Problem List:    Patient Active Problem List    Diagnosis Date Noted    Senile dementia (H) 07/20/2011     Priority: Medium    Advanced directives, counseling/discussion 05/26/2011     Priority: Medium     Advance Directive Problem  List Overview:   Name Relationship Phone    Primary Health Care Agent Erika Bermudez  [phone]         Alternative Health Care Agent Kristina Bermudez [phone]       Patient states has Advance Directive and will bring in a copy to clinic. 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      Hyperlipidemia with target LDL less than 70      Priority: Medium     Diagnosis updated by automated process. Provider to review and confirm.      Personal history of tobacco use, presenting hazards to health      Priority: Medium     Quit 2008      Peripheral vascular disease (H)      Priority: Medium     Carotid stenosis - 70% on MRA 8/09  S/p iliac stenting      BPH (benign prostatic hypertrophy)      Priority: Medium     (Problem list name updated by automated process. Provider to review and confirm.)      Abdominal aortic aneurysm (H)      Priority: Medium     3 cm in 2007  3 cm in 9/2010  3.1 cm in 10/2011      COPD (chronic obstructive pulmonary disease) (H)      Priority: Medium    Coronary atherosclerosis of native coronary artery      Priority: Medium     CABG 2007          Past Medical History:    Past Medical History:   Diagnosis Date    Abdominal aortic aneurysm (H)     BPH     COPD (chronic obstructive pulmonary disease) (H)     Coronary atherosclerosis of native coronary artery     Diabetes type II     Glaucoma     Hyperlipidemia LDL goal < 70     Peripheral vascular disease (H)     S/P insertion of iliac artery stent 2007    Seborrheic dermatitis     Tobacco abuse        Past Surgical History:    Past Surgical  History:   Procedure Laterality Date    CORONARY ARTERY BYPASS      HC PTA ILIAC ARTERY      iliac stent    HERNIA REPAIR, INGUINAL RT/LT      IR LUNG BIOPSY MEDIASTINUM LEFT  2023    ZZC ANESTH,BLEPHAROPLASTY         Family History:    Family History   Problem Relation Age of Onset    Alzheimer Disease Mother     C.A.D. Paternal Grandfather     Psychotic Disorder Paternal Grandfather        Social History:  Marital Status:   [2]  Social History     Tobacco Use    Smoking status: Former     Types: Cigarettes     Quit date: 2006     Years since quittin.7    Smokeless tobacco: Never   Substance Use Topics    Alcohol use: No    Drug use: No        Medications:    amoxicillin-clavulanate (AUGMENTIN) 875-125 MG tablet  azithromycin (ZITHROMAX) 250 MG tablet  aspirin 81 MG tablet  atorvastatin (LIPITOR) 80 MG tablet  Blood Glucose Monitoring Suppl (ONE TOUCH BASIC SYSTEM) W/DEVICE KIT  Blood Glucose Monitoring Suppl W/DEVICE KIT  cholestyramine light (QUESTRAN) 4 GM packet  coenzyme Q-10 200 MG CAPS  Diphenhydramine-Phenylephrine (DELSYM NIGHT TIME COUGH/COLD) 6.25-2.5 MG/5ML LIQD  donepezil (ARICEPT) 10 MG tablet  glucose blood VI test strips strip  lisinopril (PRINIVIL,ZESTRIL) 5 MG tablet  Loratadine-Pseudoephedrine (ALLERGY RELIEF D-24 PO)  metoprolol (TOPROL-XL) 100 MG 24 hr tablet  MULTI-VITAMIN OR          Review of Systems    Physical Exam   BP: 135/82  Pulse: 96  Temp: 97.9  F (36.6  C)  Resp: 22  Weight: 93.4 kg (206 lb)  SpO2: 91 %      Physical Exam  Vitals and nursing note reviewed.   Constitutional:       General: He is not in acute distress.     Appearance: He is well-developed and normal weight. He is ill-appearing. He is not toxic-appearing.   HENT:      Head: Normocephalic and atraumatic.      Mouth/Throat:      Mouth: Mucous membranes are moist.   Cardiovascular:      Rate and Rhythm: Normal rate and regular rhythm.   Pulmonary:      Effort: Pulmonary effort is normal.      Breath  sounds: Examination of the right-middle field reveals rales. Examination of the left-middle field reveals rales. Examination of the right-lower field reveals rales. Examination of the left-lower field reveals rales. Rales present. No decreased breath sounds or wheezing.   Chest:      Chest wall: No tenderness, crepitus or edema.   Musculoskeletal:         General: Normal range of motion.   Skin:     General: Skin is warm and dry.      Capillary Refill: Capillary refill takes less than 2 seconds.   Neurological:      General: No focal deficit present.      Mental Status: He is alert.   Psychiatric:         Mood and Affect: Mood normal. Mood is not anxious.         Behavior: Behavior normal.         ED Course                 Procedures              EKG Interpretation:      Interpreted by Birgit Alfonso MD  Time reviewed: 1207  Symptoms at time of EKG: dyspnea  Rhythm: normal sinus   Rate: normal  Axis: normal  Ectopy: none  Conduction: Right bundle branch block  ST Segments/ T Waves: No ST-T wave changes  Q Waves: none  Comparison to prior: No old EKG available however compared to lab work approximate 10 years ago without signs of right bundle branch block.    Clinical Impression: normal EKG           Results for orders placed or performed during the hospital encounter of 09/22/23 (from the past 24 hour(s))   CBC with platelets differential    Narrative    The following orders were created for panel order CBC with platelets differential.  Procedure                               Abnormality         Status                     ---------                               -----------         ------                     CBC with platelets and d...[[id number]]  Abnormal            Final result               Manual Differential[[id number]]          Abnormal            Final result                 Please view results for these tests on the individual orders.   Comprehensive metabolic panel   Result Value Ref Range    Sodium 138  136 - 145 mmol/L    Potassium 4.3 3.4 - 5.3 mmol/L    Chloride 102 98 - 107 mmol/L    Carbon Dioxide (CO2) 23 22 - 29 mmol/L    Anion Gap 13 7 - 15 mmol/L    Urea Nitrogen 12.7 8.0 - 23.0 mg/dL    Creatinine 1.15 0.67 - 1.17 mg/dL    Calcium 9.2 8.8 - 10.2 mg/dL    Glucose 115 (H) 70 - 99 mg/dL    Alkaline Phosphatase 101 40 - 129 U/L    AST 29 0 - 45 U/L    ALT 22 0 - 70 U/L    Protein Total 7.3 6.4 - 8.3 g/dL    Albumin 4.3 3.5 - 5.2 g/dL    Bilirubin Total 0.7 <=1.2 mg/dL    GFR Estimate 64 >60 mL/min/1.73m2   Troponin T, High Sensitivity   Result Value Ref Range    Troponin T, High Sensitivity 14 <=22 ng/L   Nt probnp inpatient (BNP)   Result Value Ref Range    N terminal Pro BNP Inpatient 372 0 - 1,800 pg/mL   Procalcitonin   Result Value Ref Range    Procalcitonin 0.09 (H) <0.05 ng/mL   CBC with platelets and differential   Result Value Ref Range    WBC Count 32.1 (H) 4.0 - 11.0 10e3/uL    RBC Count 4.46 4.40 - 5.90 10e6/uL    Hemoglobin 13.0 (L) 13.3 - 17.7 g/dL    Hematocrit 39.2 (L) 40.0 - 53.0 %    MCV 88 78 - 100 fL    MCH 29.1 26.5 - 33.0 pg    MCHC 33.2 31.5 - 36.5 g/dL    RDW 16.7 (H) 10.0 - 15.0 %    Platelet Count 107 (L) 150 - 450 10e3/uL   Manual Differential   Result Value Ref Range    % Neutrophils 61 %    % Lymphocytes 11 %    % Monocytes 28 %    % Eosinophils 0 %    % Basophils 0 %    Absolute Neutrophils 19.6 (H) 1.6 - 8.3 10e3/uL    Absolute Lymphocytes 3.5 0.8 - 5.3 10e3/uL    Absolute Monocytes 9.0 (H) 0.0 - 1.3 10e3/uL    Absolute Eosinophils 0.0 0.0 - 0.7 10e3/uL    Absolute Basophils 0.0 0.0 - 0.2 10e3/uL    RBC Morphology Confirmed RBC Indices     Platelet Assessment  Automated Count Confirmed. Platelet morphology is normal.     Automated Count Confirmed. Platelet morphology is normal.    Toxic Neutrophils Present (A) None Seen   CT CHEST W CONTRAST    Narrative    CT CHEST W CONTRAST 9/22/2023 1:17 PM    CLINICAL HISTORY: Dyspnea, coughing, tachycardia  TECHNIQUE: CT chest with IV  contrast. Multiplanar reformats were  obtained. Dose reduction techniques were used.    CONTRAST: 81mL, Isovue 370    COMPARISON: Chest radiograph 7/31/2013    FINDINGS:   LUNGS AND PLEURA: Mild upper lobe predominant centrilobular and  paraseptal emphysema. Newly visualized airspace consolidation in the  left upper lobe with associated small, likely loculated, left pleural  effusion.    MEDIASTINUM/AXILLAE: Dilation of the pulmonary arteries noted. No  lymphadenopathy. No thoracic aneurysm. Right chest port with tip at  the cavoatrial junction.    CORONARY ARTERY CALCIFICATION: Previous intervention (CABG).    UPPER ABDOMEN: Bilateral adrenal nodules, measuring 2.5 cm on the  right and 2.7 cm on the left (series 2 images 51 and 55).  Cholecystectomy. No evidence of biliary obstruction.    MUSCULOSKELETAL: No acute bony abnormality.        Impression    IMPRESSION:   1.  Likely left upper lobe pneumonia with associated small, loculated  parapneumonic effusion. Recommend radiographic follow-up to resolution  to exclude underlying mass lesion.  2.  Mild upper lobe predominant emphysema. Dilated pulmonary arteries,  which can be seen with pulmonary hypertension.  3.  Bilateral adrenal nodules, statistically represent adenomas and  absence of known primary malignancy but consider biochemical workup  and follow-up CT or MRI in 6 months to document size stability.    RO ELMORE MD         SYSTEM ID:  [id number]       Medications   iopamidol (ISOVUE-370) solution 500 mL (81 mLs Intravenous $Given 9/22/23 1310)   sodium chloride 0.9 % bag 100mL for CT scan flush use (60 mLs Intravenous $Given 9/22/23 1310)   amoxicillin-clavulanate (AUGMENTIN) 875-125 MG per tablet 1 tablet (1 tablet Oral $Given 9/22/23 1349)   azithromycin (ZITHROMAX) tablet 500 mg (500 mg Oral $Given 9/22/23 1349)       Assessments & Plan (with Medical Decision Making)     I have reviewed the nursing notes.    I have reviewed the findings, diagnosis,  plan and need for follow up with the patient.          Medical Decision Making    83-year-old male with numerous morbidities following up with intermittent increase in heart rate and shortness of breath.  He is using his oxygen as required has not required an increase in oxygen.  He has a history of COPD and lung cancer.  He notes over the last 4 days has been having increase in his heart rate intermittently with ambulation and feeling of slightly more short of breath with intermittent increase in coughing.  Differential includes worsening of his malignancy, pneumonia, pneumothorax, pulmonary effusion, pericardial effusion, anemia, viral infection.  EKG did show right bundle branch block with a regular rhythm with intermittent PVCs.  No acute signs of ischemia troponin not elevated therefore likely be elevated as patient's its been going on for 4 days.  BNP at 372 and consistent with CHF.  No electrolyte disturbances or renal injury and procalcitonin mildly elevated 0.09.  Patient vital stable throughout admission work-up .his lab work was consistent with elevated white cell count as and concerns of a left upper lobe pneumonia.  Patient was provided with Augmentin and azithromycin.  Discussed extensively admission versus discharge of which patient would prefer to be discharged home as he has upcoming appointment with his pulmonologist and has upcoming CT imaging early next week and feels comfortable with discharging home with his wife and his daughter.  Discussed that due to his vitals otherwise stable at this time and adherence to his medications being very important to feel comfortable discharging home with wife and daughter as the are very well of patient's health and reasons to come back.  Extensive discharge discussions had with entirety of family and patient.  Patient understands recommendations.  Discharged home with continued Augmentin and azithromycin prescription.      Current Discharge Medication List         START taking these medications    Details   amoxicillin-clavulanate (AUGMENTIN) 875-125 MG tablet Take 1 tablet by mouth 2 times daily  Qty: 28 tablet, Refills: 0      azithromycin (ZITHROMAX) 250 MG tablet Take 2 tablets (500 mg) by mouth daily for 1 day, THEN 1 tablet (250 mg) daily for 4 days.  Qty: 6 tablet, Refills: 0             Final diagnoses:   Pneumonia of left upper lobe due to infectious organism       9/22/2023   [facility] EMERGENCY DEPT       Birgit Alfonso MD  09/22/23 7880

## 2023-09-22 NOTE — ED TRIAGE NOTES
C/o worsening dyspnea and hypoxia, tachycardia with ambulation. Pt has hx of lung cancer and COPD - does not typically wear o2 but has been on 2L last couple days. Pt HR with ambulation 120s per wife and o2 drops below 89 without oxygen. Pt on 2L in triage 91%. Denies chest pain, no pain at all. Mild cough and congestion, denies fever.      Triage Assessment       Row Name 09/22/23 1111       Triage Assessment (Adult)    Airway WDL WDL       Respiratory WDL    Respiratory WDL WDL       Cardiac WDL    Cardiac WDL WDL

## 2023-09-24 PROBLEM — C34.90 LUNG CANCER (H): Status: ACTIVE | Noted: 2023-01-01

## 2023-09-24 PROBLEM — J44.1 COPD EXACERBATION (H): Status: ACTIVE | Noted: 2023-01-01

## 2023-09-24 PROBLEM — J15.9 COMMUNITY ACQUIRED BACTERIAL PNEUMONIA: Status: ACTIVE | Noted: 2023-01-01

## 2023-09-24 PROBLEM — D72.829 LEUKOCYTOSIS, UNSPECIFIED TYPE: Status: ACTIVE | Noted: 2023-01-01

## 2023-09-24 PROBLEM — J18.9 PNEUMONIA: Status: ACTIVE | Noted: 2023-01-01

## 2023-09-24 NOTE — PHARMACY-VANCOMYCIN DOSING SERVICE
Pharmacy Vancomycin Initial Note  Date of Service 2023  Patient's  1940  83 year old, male    Indication: Sepsis    Current estimated CrCl = CrCl cannot be calculated (Unknown ideal weight.).    Creatinine for last 3 days  2023: 12:12 PM Creatinine 1.15 mg/dL  2023: 10:43 PM Creatinine 1.10 mg/dL    Recent Vancomycin Level(s) for last 3 days  No results found for requested labs within last 3 days.      Vancomycin IV Administrations (past 72 hours)        No vancomycin orders with administrations in past 72 hours.                    Nephrotoxins and other renal medications (From now, onward)      Start     Dose/Rate Route Frequency Ordered Stop    23 0140  vancomycin (VANCOCIN) 1,000 mg in 200 mL dextrose intermittent infusion         1,000 mg  200 mL/hr over 1 Hours Intravenous ONCE 23 0135              Contrast Orders - past 72 hours (72h ago, onward)      None            [employer] Prediction of Planned Initial Vancomycin Regimen  Loading dose: N/A  Regimen: 1500 mg IV every 24 hours.  Start time: 01:47 on 2023  Exposure target: AUC24 (range)400-600 mg/L.hr   AUC24,ss: 495 mg/L.hr  Probability of AUC24 > 400: 74 %  Ctrough,ss: 14.8 mg/L  Probability of Ctrough,ss > 20: 22 %  Probability of nephrotoxicity (Lodise JOZEF ): 10 %          Plan:  Start vancomycin  1500 mg IV q24h.   Vancomycin monitoring method: AUC  Vancomycin therapeutic monitoring goal: 400-600 mg*h/L  Pharmacy will check vancomycin levels as appropriate in 1-3 Days.    Serum creatinine levels will be ordered daily for the first week of therapy and at least twice weekly for subsequent weeks.      Toi Gomez, PharmD

## 2023-09-24 NOTE — PROGRESS NOTES
S-(situation): Patient arrives to room 250 via cart from ED.     B-(background): Fever, SOB and elevated HR     A-(assessment): Pt A&O with forgetfulness. Denies pain. Afebrile upon arrival to unit. Lungs clear but diminished in bases. Has trace edema to BLEs. Port to R chest.     R-(recommendations): Orders reviewed with patient and daughter. Will monitor patient per MD orders.     Inpatient nursing criteria listed below were met:    Health care directives status obtained and documented: Yes  VTE ordered/documented: Yes  Skin issues/needs documented:Yes  Isolation addressed and Signage used: Yes  Fall Prevention: Care plan updated Yes Education given and documented Yes  Care Plan initiated and Co-Morbidities added: Yes  Education Assessment documented:Yes  Admission Education Documented: Yes  If present CAUTI/CLABI Education done: NA  New medication patient education completed and documented (Possible Side Effects of Common Medications handout): Yes  Allergies Reviewed: Yes  Admission Medication Reconciliation completed: Yes  Home medications if not able to send immediately home with family stored here: NA  Reminder note placed in discharge instructions regarding home meds: NA  Individualized care needs/preferences addressed and charted: Yes  Provider Notified that patient has arrived to the unit: Yes

## 2023-09-24 NOTE — PROGRESS NOTES
[facility]    Medicine Progress Note - Hospitalist Service    Date of Admission:  9/23/2023    Assessment & Plan   Acute on chronic hypoxic respiratory failure/CAP  Patient with complex PMH including small cell lung ca, former 75 pack year smoker, Moderate COPD (on intermittent home oxygen with activity as needed) CAD and dementia presented to the ED with increasing shortness of breath and cough.  CT chest revealed left upper lobe pneumonia with small loculated parapneumonic effusion therefore he was placed on Vancomycin and zosyn on admission.  Wbc  34. Procal 0.09.  Platelets 106,000 on admission as well.  The lactate was normal. COVID, flu, RSV were negative.  Chest xray did show pulmonary venous congestion as well.    -placed on aggressive antibiotic regimen (patient on previous immunotherapy and also parapneumonic effusion by ct).    -Observe clinical status on vancomycin/zosyn regimen due to nephrotoxicity.    -Will have antibiotic ID round team weigh in on this tomorrow as well.   -continue home bronchodilator regimen  -titrate SaO2 88 to 94% (currently on 3.5 liters n/c)  -pulmonary hygiene  -follow blood culx  -follow markers of inflammation   -clinically stable now, if clinical status worsens low threshold for repeat imaging, thora, drain, vs transfer for VATS          **The patient is not sure when his last immunotherapy, will need to find outpatient records for his last dosing    COPD exacerbation   Continue home bronchodilator regimen  SaO2 88-94%  Will try to keep him on the dry side as well although due to the vancomycin/zosyn regimen may have to hold off on more aggressive diuresis for now      Leukocytosis, unspecified type  Follow culx  Continue treatment for above    Lung cancer (H)  Was not a candidate for resection  ? Last dose of immunotherapy    CAD  Venous congestion on imaging  Continue BB and ACE  Echocardiogram tomorrow  Daily weights  I/O's       Diet:  "Consistent Carbohydrate Diet Moderate Consistent Carb (60 g CHO per Meal) Diet    DVT Prophylaxis: Enoxaparin (Lovenox) SQ  Andrews Catheter: Not present  Lines: PRESENT      Port a Cath 09/23/23 Right Chest wall-Site Assessment: WDL      Cardiac Monitoring: None  Code Status: Full Code      Clinically Significant Risk Factors Present on Admission                # Drug Induced Platelet Defect: home medication list includes an antiplatelet medication   # Hypertension: Noted on problem list   # Dementia: noted on problem list    # Obesity: Estimated body mass index is 33.83 kg/m  as calculated from the following:    Height as of this encounter: 1.676 m (5' 6\").    Weight as of this encounter: 95.1 kg (209 lb 9.6 oz).              Disposition Plan home     Expected Discharge Date: 09/26/2023                The patient's care was discussed with the Patient and the entire care team    Ana Suarez CNP  Hospitalist Service  [facility]  Securely message with [employer] (more info)  Text page via [employer] Paging/Directory     Interval History   Patient admitted this am, no new complaints since admission    Physical Exam   Vital Signs: Temp: 98  F (36.7  C) Temp src: Oral BP: 122/51 Pulse: 99   Resp: 20 SpO2: 95 % O2 Device: Nasal cannula Oxygen Delivery: 4 LPM  Weight: 209 lbs 9.6 oz    Exam  Gen:  lying in bed no acute distress  HEENT:  normocephalic, atraumatic, oropharynx clear  Resp:  non labored at rest, no wheeze, decreased bibasilarly  Card:  S1,S2, RRR no murmur, rub or gallop  Abd:  Obese, soft,  non tender,  normoactive bowel sounds   Neuro:  baseline dementia, no focal deficits  Skin:  no gross lesions or rashes    Medical Decision Making       45 MINUTES SPENT BY ME on the date of service doing chart review, history, exam, documentation & further activities per the note.  Tests ORDERED & REVIEWED in the past 24 hours:  Tests ORDERED in the past 24 hours:           "

## 2023-09-24 NOTE — ED PROVIDER NOTES
History     Chief Complaint   Patient presents with    Shortness of Breath     HPI  Fernando Bermudez is a 83 year old male with history of dementia, hypertension, COPD, coronary disease with stent, lung cancer on immunotherapy presents for evaluation of cough, shortness of breath, tachycardia.  Patient has had the symptoms for the past several days.  He was evaluated in this emergency department yesterday.  Work-up revealed significant leukocytosis at 32.1, mild anemia, largely normal electrolytes, negative troponin/BNP.  CT of the chest shows left upper lobe pneumonia with small loculated parapneumonic effusion.  He also had emphysema.  Admission to the hospital was recommended but the patient ultimately declined, instead being discharged home on Augmentin and azithromycin.  Spite taking these medications as instructed he has noted increased cough, shortness of breath, and fever intermittently throughout the day today.  Due to worsening symptoms he now returns to the emergency department for reevaluation.  He usually only uses supplemental oxygen when walking around outside the home, but he has needed it even after walking short distances today.  Patient otherwise denies chest pain, palpitations, pain or swelling of the legs, other complaints.    Of note his most recent immunotherapy was about a month ago.    Allergies:  Allergies   Allergen Reactions    Niacin Unknown       Problem List:    Patient Active Problem List    Diagnosis Date Noted    Community acquired bacterial pneumonia 09/24/2023     Priority: Medium    COPD exacerbation (H) 09/24/2023     Priority: Medium    Leukocytosis, unspecified type 09/24/2023     Priority: Medium    Pneumonia 09/24/2023     Priority: Medium    Senile dementia (H) 07/20/2011     Priority: Medium    Advanced directives, counseling/discussion 05/26/2011     Priority: Medium     Advance Directive Problem List Overview:   Name Relationship Phone    Primary Health Care Agent  Erika Bermudez  [phone]         Alternative Health Care Agent Kristina Bermudez [phone]       Patient states has Advance Directive and will bring in a copy to clinic. 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      Hyperlipidemia with target LDL less than 70      Priority: Medium     Diagnosis updated by automated process. Provider to review and confirm.      Personal history of tobacco use, presenting hazards to health      Priority: Medium     Quit 2008      Peripheral vascular disease (H)      Priority: Medium     Carotid stenosis - 70% on MRA 8/09  S/p iliac stenting      BPH (benign prostatic hypertrophy)      Priority: Medium     (Problem list name updated by automated process. Provider to review and confirm.)      Abdominal aortic aneurysm (H)      Priority: Medium     3 cm in 2007  3 cm in 9/2010  3.1 cm in 10/2011      COPD (chronic obstructive pulmonary disease) (H)      Priority: Medium    Coronary atherosclerosis of native coronary artery      Priority: Medium     CABG 2007          Past Medical History:    Past Medical History:   Diagnosis Date    Abdominal aortic aneurysm (H)     BPH     COPD (chronic obstructive pulmonary disease) (H)     Coronary atherosclerosis of native coronary artery     Diabetes type II     Glaucoma     Hyperlipidemia LDL goal < 70     Peripheral vascular disease (H)     S/P insertion of iliac artery stent 2007    Seborrheic dermatitis     Tobacco abuse        Past Surgical History:    Past Surgical History:   Procedure Laterality Date    CORONARY ARTERY BYPASS      HC PTA  ILIAC ARTERY      iliac stent    HERNIA REPAIR, INGUINAL RT/LT      IR LUNG BIOPSY MEDIASTINUM LEFT  2023    ZZC ANESTH,BLEPHAROPLASTY         Family History:    Family History   Problem Relation Age of Onset    Alzheimer Disease Mother     C.A.D. Paternal Grandfather     Psychotic Disorder Paternal Grandfather        Social History:  Marital Status:   [2]  Social History     Tobacco Use    Smoking status: Former     Types: Cigarettes     Quit date: 2006     Years since quittin.7    Smokeless tobacco: Never   Substance Use Topics    Alcohol use: No    Drug use: No        Medications:    amoxicillin-clavulanate (AUGMENTIN) 875-125 MG tablet  aspirin 81 MG tablet  atorvastatin (LIPITOR) 80 MG tablet  azithromycin (ZITHROMAX) 250 MG tablet  Blood Glucose Monitoring Suppl (ONE TOUCH BASIC SYSTEM) W/DEVICE KIT  Blood Glucose Monitoring Suppl W/DEVICE KIT  cholestyramine light (QUESTRAN) 4 GM packet  coenzyme Q-10 200 MG CAPS  Diphenhydramine-Phenylephrine (DELSYM NIGHT TIME COUGH/COLD) 6.25-2.5 MG/5ML LIQD  donepezil (ARICEPT) 10 MG tablet  glucose blood VI test strips strip  lisinopril (PRINIVIL,ZESTRIL) 5 MG tablet  Loratadine-Pseudoephedrine (ALLERGY RELIEF D-24 PO)  metoprolol (TOPROL-XL) 100 MG 24 hr tablet  MULTI-VITAMIN OR      Review of Systems   All other systems reviewed and are negative.  See HPI.    Physical Exam   BP: 122/72  Pulse: 100  Temp: 97  F (36.1  C)  Resp: 18  Weight: 93.4 kg (206 lb)  SpO2: 93 %      Physical Exam  Vitals and nursing note reviewed.   Constitutional:       General: He is not in acute distress.     Appearance: Normal appearance. He is well-developed. He is not toxic-appearing.      Comments: Patient is speaking comfortably in full sentences, no signs of respiratory distress.  Overall he appears nontoxic at present.   HENT:      Head: Atraumatic.      Mouth/Throat:      Pharynx: Oropharynx is clear. No oropharyngeal exudate.      Comments: Slightly tacky mucous  membranes.  Eyes:      General: No scleral icterus.     Conjunctiva/sclera: Conjunctivae normal.      Pupils: Pupils are equal, round, and reactive to light.   Cardiovascular:      Rate and Rhythm: Regular rhythm. Tachycardia present.      Heart sounds: Normal heart sounds. No murmur heard.  Pulmonary:      Effort: Pulmonary effort is normal. No tachypnea or respiratory distress.      Breath sounds: Normal breath sounds. No decreased breath sounds, wheezing or rhonchi.      Comments: Speaking comfortably in full sentences.  Clear lung sounds are present.  No obvious wheezing or diminished air movement.  Chest:      Chest wall: No deformity.   Abdominal:      Palpations: Abdomen is soft.      Tenderness: There is no abdominal tenderness.   Musculoskeletal:         General: No deformity. Normal range of motion.      Cervical back: Normal range of motion and neck supple.      Right lower leg: No tenderness. No edema.      Left lower leg: No tenderness. No edema.   Skin:     General: Skin is warm.      Capillary Refill: Capillary refill takes less than 2 seconds.   Neurological:      General: No focal deficit present.      Mental Status: He is alert.      Cranial Nerves: No cranial nerve deficit.      Motor: No weakness.   Psychiatric:         Mood and Affect: Mood normal.         ED Course             Procedures         Sinus rhythm with occasional PVC.  Rate 98.  Normal axis.  Right bundle branch block, otherwise normal intervals.  Nonspecific ST and T wave changes.  Exam independently interpreted by me.         Results for orders placed or performed during the hospital encounter of 09/23/23 (from the past 24 hour(s))   Symptomatic Influenza A/B, RSV, & SARS-CoV2 PCR (COVID-19) Nasopharyngeal    Specimen: Nasopharyngeal; Swab   Result Value Ref Range    Influenza A PCR Negative Negative    Influenza B PCR Negative Negative    RSV PCR Negative Negative    SARS CoV2 PCR Negative Negative    Narrative    Testing was  performed using the Xpert Xpress CoV2/Flu/RSV Assay on the [employer] GeneXpert Instrument. This test should be ordered for the detection of SARS-CoV-2, influenza, and RSV viruses in individuals who meet clinical and/or epidemiological criteria. Test performance is unknown in asymptomatic patients. This test is for in vitro diagnostic use under the FDA EUA for laboratories certified under CLIA to perform high or moderate complexity testing. This test has not been FDA cleared or approved. A negative result does not rule out the presence of PCR inhibitors in the specimen or target RNA in concentration below the limit of detection for the assay. If only one viral target is positive but coinfection with multiple targets is suspected, the sample should be re-tested with another FDA cleared, approved, or authorized test, if coinfection would change clinical management. This test was validated by the [facility]. These laboratories are certified under the Clinical Laboratory Improvement Amendments of 1988 (CLIA-88) as qualified to perform high complexity laboratory testing.   CBC with platelets differential    Narrative    The following orders were created for panel order CBC with platelets differential.  Procedure                               Abnormality         Status                     ---------                               -----------         ------                     CBC with platelets and d...[[id number]]  Abnormal            Final result               Manual Differential[[id number]]          Abnormal            Final result                 Please view results for these tests on the individual orders.   Comprehensive metabolic panel   Result Value Ref Range    Sodium 138 136 - 145 mmol/L    Potassium 4.2 3.4 - 5.3 mmol/L    Chloride 103 98 - 107 mmol/L    Carbon Dioxide (CO2) 21 (L) 22 - 29 mmol/L    Anion Gap 14 7 - 15 mmol/L    Urea Nitrogen 17.9 8.0 - 23.0 mg/dL    Creatinine 1.10 0.67 - 1.17  mg/dL    Calcium 9.1 8.8 - 10.2 mg/dL    Glucose 117 (H) 70 - 99 mg/dL    Alkaline Phosphatase 94 40 - 129 U/L    AST 24 0 - 45 U/L    ALT 20 0 - 70 U/L    Protein Total 6.9 6.4 - 8.3 g/dL    Albumin 4.1 3.5 - 5.2 g/dL    Bilirubin Total 0.6 <=1.2 mg/dL    GFR Estimate 67 >60 mL/min/1.73m2   Troponin T, High Sensitivity   Result Value Ref Range    Troponin T, High Sensitivity 15 <=22 ng/L   Nt probnp inpatient   Result Value Ref Range    N terminal Pro BNP Inpatient 519 0 - 1,800 pg/mL   Lactic acid whole blood   Result Value Ref Range    Lactic Acid 0.9 0.7 - 2.0 mmol/L   CBC with platelets and differential   Result Value Ref Range    WBC Count 34.0 (H) 4.0 - 11.0 10e3/uL    RBC Count 4.20 (L) 4.40 - 5.90 10e6/uL    Hemoglobin 12.3 (L) 13.3 - 17.7 g/dL    Hematocrit 36.8 (L) 40.0 - 53.0 %    MCV 88 78 - 100 fL    MCH 29.3 26.5 - 33.0 pg    MCHC 33.4 31.5 - 36.5 g/dL    RDW 16.7 (H) 10.0 - 15.0 %    Platelet Count 106 (L) 150 - 450 10e3/uL   Manual Differential   Result Value Ref Range    % Neutrophils 57 %    % Lymphocytes 18 %    % Monocytes 25 %    % Eosinophils 0 %    % Basophils 0 %    Absolute Neutrophils 19.4 (H) 1.6 - 8.3 10e3/uL    Absolute Lymphocytes 6.1 (H) 0.8 - 5.3 10e3/uL    Absolute Monocytes 8.5 (H) 0.0 - 1.3 10e3/uL    Absolute Eosinophils 0.0 0.0 - 0.7 10e3/uL    Absolute Basophils 0.0 0.0 - 0.2 10e3/uL    RBC Morphology Confirmed RBC Indices     Platelet Assessment  Automated Count Confirmed. Platelet morphology is normal.     Automated Count Confirmed. Platelet morphology is normal.    Hypersegmented Neutrophils Present (A) None Seen    Reactive Lymphocytes Present (A) None Seen   XR Chest Port 1 View    Narrative    EXAM: XR CHEST PORT 1 VIEW  LOCATION: [facility]  DATE: 9/23/2023    INDICATION: Shortness of breath  COMPARISON: 01/23/2023      Impression    IMPRESSION: There are findings worrisome for active CHF/volume overload with slight cardiomegaly and mild  pulmonary venous congestion. Probable minute left pleural effusion. Slight linear atelectasis in both lung bases. Postoperative changes sternotomy.   Port-A-Cath with tip in the lower SVC. Mild to moderate hypertrophic changes most marked in the thoracic spine. Partial loss involving the lateral aspect of the normally seen left hemidiaphragm presumed to represent changes of atelectasis.       Medications   vancomycin (VANCOCIN) 1,000 mg in 200 mL dextrose intermittent infusion (has no administration in time range)   ampicillin-sulbactam (UNASYN) 3 g vial to attach to  mL bag (0 g Intravenous Stopped 9/24/23 0030)   doxycycline (VIBRAMYCIN) 100 mg vial to attach to  mL bag (0 mg Intravenous Stopped 9/24/23 0020)   sodium chloride 0.9% BOLUS 500 mL (500 mLs Intravenous $New Bag 9/23/23 2311)       Assessments & Plan (with Medical Decision Making)     I have reviewed the nursing notes.    I have reviewed the findings, diagnosis, plan and need for follow up with the patient.  Medical Decision Making  Fernando Bermudez is a 83 year old male with history of dementia, hypertension, COPD, coronary disease with stent, lung cancer on immunotherapy presents for evaluation of cough, shortness of breath, tachycardia.  See details from work-up yesterday, consistent with pneumonia and leukocytosis greater than 30.  Today he is borderline tachycardic and has saturations in the low 90s on 2 L by nasal cannula.  He appears well overall given his work-up results yesterday.  Borderline tachypneic at times but otherwise conversant in full sentences without difficulty.  He has good air movement overall today without significant wheezing.  There are very faint scattered crackles.  Otherwise he denies chest pain and has no lower extremity edema or calf tenderness.  Symptoms are consistent with progression of known pneumonia.  He is immune compromised and had a significantly elevated white count yesterday.  Symptoms have  progressed despite outpatient antibiotics.    Results today were significant for WBC of 34.0, mild anemia with hemoglobin 12.3, normal electrolytes, negative troponin and BNP.  EKG shows right bundle branch block but no acute ischemic appearing changes.  Chest x-ray today showed findings concerning for possible pulmonary venous congestion and probable small left pleural effusion.  It is concerning that he has worsened symptoms and progressively worsened leukocytosis despite taking antibiotics at home.  With his history of lung cancer and immune compromised status I did recommend admission to the hospital for continued respiratory support and IV antibiotics.  Patient was agreeable with this plan.  I discussed the case with Dr. Meesala who agreed to admit.    New Prescriptions    No medications on file       Final diagnoses:   Community acquired bacterial pneumonia   COPD exacerbation (H)   Leukocytosis, unspecified type       9/23/2023   [facility] EMERGENCY DEPT       Pop Burgos MD  09/24/23 0148

## 2023-09-24 NOTE — MEDICATION SCRIBE - ADMISSION MEDICATION HISTORY
Medication Scribe Admission Medication History    Admission medication history is complete. The information provided in this note is only as accurate as the sources available at the time of the update.    Medication reconciliation/reorder completed by provider prior to medication history? Yes    Information Source(s): Family member and CareEverywhere/SureScripts via in-person    Pertinent Information: deep James    Changes made to PTA medication list:  Added: trellegy ellipta, memantine, timolol, latanoprost, mucinex  Deleted: BG testing supplies, Claritin D  Changed: metoprolol succinate 50mg instead of 100mg    Medication Affordability:  Not including over the counter (OTC) medications, was there a time in the past 3 months when you did not take your medications as prescribed because of cost?: No    Allergies reviewed with patient and updates made in EHR: yes    Medication History Completed By: DEYA NOLAN 9/24/2023 12:45 PM    Prior to Admission medications    Medication Sig Last Dose Taking? Auth Provider Long Term End Date   amoxicillin-clavulanate (AUGMENTIN) 875-125 MG tablet Take 1 tablet by mouth 2 times daily 9/23/2023 at 1800 Yes Birgit Alfonso MD     aspirin 81 MG tablet Take 1 tablet by mouth daily. 9/23/2023 at 0600 Yes Mika Lima Jr., MD     atorvastatin (LIPITOR) 80 MG tablet Take 1 tablet (80 mg) by mouth daily 9/23/2023 at 0600 Yes Can Solis MD Yes    azithromycin (ZITHROMAX) 250 MG tablet Take 2 tablets (500 mg) by mouth daily for 1 day, THEN 1 tablet (250 mg) daily for 4 days. 9/23/2023 at 0600 Yes Birgit Alfosno MD  9/27/23   Blood Glucose Monitoring Suppl (ONE TOUCH BASIC SYSTEM) W/DEVICE KIT Use to test sugars up to once daily.  Dispense with lancets, test strips to last 3 months. Unknown at unkn Yes Can Solis MD     Blood Glucose Monitoring Suppl W/DEVICE KIT 1 kit daily Unknown at unkn Yes Can Solis MD     cholestyramine light  (QUESTRAN) 4 GM packet TAKE ONE  PACKET (4G)BY MOUTH TWICE DAILY AS NEEDED 9/23/2023 at 1800 Yes Can Solis MD Yes    coenzyme Q-10 200 MG CAPS Take 1 capsule by mouth daily 9/23/2023 at 600 Yes Can Solis MD     Diphenhydramine-Phenylephrine (DELSYM NIGHT TIME COUGH/COLD) 6.25-2.5 MG/5ML LIQD  Unknown at unkn Yes Reported, Patient     donepezil (ARICEPT) 10 MG tablet Take 1 tablet (10 mg) by mouth At Bedtime 9/23/2023 at 1800 Yes Can Solis MD     Fluticasone-Umeclidin-Vilant (TRELEGY ELLIPTA) 100-62.5-25 MCG/ACT oral inhaler Inhale 1 puff into the lungs daily 9/23/2023 at am Yes Reported, Patient     glucose blood VI test strips strip by In Vitro route daily. For daily glucose testing.  Pt requests OneTouch Ultra Mini strips. Unknown at unkn Yes Can Solis MD Yes    guaiFENesin (MUCINEX) 600 MG 12 hr tablet Take 600 mg by mouth daily 9/23/2023 at am Yes Reported, Patient     latanoprost (XALATAN) 0.005 % ophthalmic solution Place 1 drop into both eyes daily (with dinner) 9/23/2023 at supper Yes Reported, Patient Yes    lisinopril (PRINIVIL,ZESTRIL) 5 MG tablet TAKE ONE TABLET BY MOUTH ONCE DAILY 9/23/2023 at 0600 Yes Can Solis MD Yes    Loratadine-Pseudoephedrine (ALLERGY RELIEF D-24 PO)  Unknown at unkn Yes Reported, Patient     memantine (NAMENDA) 5 MG tablet Take 5 mg by mouth 2 times daily 9/23/2023 at supper Yes Reported, Patient     metoprolol succinate ER (TOPROL XL) 50 MG 24 hr tablet Take 1 tablet by mouth daily 9/23/2023 at am Yes Reported, Patient Yes    MULTI-VITAMIN OR 1 TABLET DAILY 9/23/2023 at 0600 Yes Reported, Patient     timolol maleate (TIMOPTIC) 0.25 % ophthalmic solution Place 1 drop into both eyes every morning 9/23/2023 at am Yes Reported, Patient

## 2023-09-24 NOTE — PLAN OF CARE
Goal Outcome Evaluation:      Plan of Care Reviewed With: patient    Overall Patient Progress: no changeOverall Patient Progress: no change    Outcome Evaluation: Pt alert and oriented with forgetfullness. Set of bed alarm a few times needing to use the bathroom. Reminded him importance of calling for help before getting up. Pt denies pain. Afebrile. Pt desats with activity to low 80s. Oxygen turned up to 4L per nasal cannula to maintain O2 sats >92%. Ate a bag lunch in the night. Port heparin locked.

## 2023-09-24 NOTE — ED TRIAGE NOTES
Pt seen in ED yesterday and taking PO abx at home for pneumonia. Taking inhaler and took tylenol at 1945 for fever 101.4. Pt is more sob, fevers today and HR has been elevated too.      Triage Assessment       Row Name 09/23/23 4610       Triage Assessment (Adult)    Airway WDL WDL       Respiratory WDL    Respiratory WDL WDL       Cardiac WDL    Cardiac WDL WDL

## 2023-09-24 NOTE — H&P
"Formerly [facility]    History and Physical - Hospitalist Service       Date of Admission:  9/23/2023    Assessment & Plan      Community acquired bacterial pneumonia  Immunocompromised host  Mirza, mario alberto  Followup blood cultures.    COPD exacerbation (H)  Followup VBG  Continue home meds.    Leukocytosis, unspecified type  Due to sepsis, pneumonia  Antibiotics, IV fluids.    Lung cancer (H)  He is not immunotherapy.  Not sure when his last dose is.             Diet: Consistent Carbohydrate Diet Moderate Consistent Carb (60 g CHO per Meal) Diet    DVT Prophylaxis: Enoxaparin (Lovenox) SQ  Andrews Catheter: Not present  Lines: PRESENT      Port a Cath 09/23/23 Right Chest wall-Site Assessment: WDL      Cardiac Monitoring: None  Code Status: Full Code      Clinically Significant Risk Factors Present on Admission                # Drug Induced Platelet Defect: home medication list includes an antiplatelet medication   # Hypertension: Noted on problem list   # Dementia: noted on problem list    # Obesity: Estimated body mass index is 33.83 kg/m  as calculated from the following:    Height as of this encounter: 1.676 m (5' 6\").    Weight as of this encounter: 95.1 kg (209 lb 9.6 oz).              Disposition Plan      Expected Discharge Date: 09/26/2023                  Yasoda Meesala, MD  Hospitalist Service  [facility]  Securely message with [employer] (more info)  Text page via [employer] Paging/Directory     ______________________________________________________________________    Chief Complaint   Shortness of breath, cough.    History is obtained from the patient    History of Present Illness   Fernando Bermudez is a 83 year old male who Fernando Bermudez is an 83-year-old male with history of dementia, lung cancer on immunotherapy, COPD, coronary disease with stent, HTN presents for evaluation of cough, shortness of breath, tachycardia. The patient has dementia, so history " is limited. He has chronic cough, worsened over the last few days. He has a productive cough, fever, and shortness of breath. His CT chest showed left upper lobe pneumonia with small loculated parapneumonic effusion. Has emphysema. He came to the ER yesterday but declined admission. He took Augmentin, Azithromycin but continued to have symptoms. His wbc is 34. Platelets are 106,000 today. COVID, flu, RSV is negative. Lactic acid is 0.9.  He uses supplemental oxygen at home only when walking outside the home.  The patient denies chest pain, palpitations, pain or swelling of the legs, other complaints. He is being admitted for pneumonia. He is kept on broad spectrum antibiotics.      The patient is not sure when his last immunotherapy was.       Past Medical History    Past Medical History:   Diagnosis Date    Abdominal aortic aneurysm (H)     BPH     COPD (chronic obstructive pulmonary disease) (H)     Coronary atherosclerosis of native coronary artery     Diabetes type II     Glaucoma     Hyperlipidemia LDL goal < 70     Peripheral vascular disease (H)     Carotid stenosis - 70% on MRA     S/P insertion of iliac artery stent     Seborrheic dermatitis     Tobacco abuse     Quit        Past Surgical History   Past Surgical History:   Procedure Laterality Date    CORONARY ARTERY BYPASS      HC PTA ILIAC ARTERY      iliac stent    HERNIA REPAIR, INGUINAL RT/LT      IR LUNG BIOPSY MEDIASTINUM LEFT  2023    ZZC ANESTH,BLEPHAROPLASTY         Prior to Admission Medications   Prior to Admission Medications   Prescriptions Last Dose Informant Patient Reported? Taking?   Blood Glucose Monitoring Suppl (ONE TOUCH BASIC SYSTEM) W/DEVICE KIT Unknown  No Yes   Sig: Use to test sugars up to once daily.  Dispense with lancets, test strips to last 3 months.   Blood Glucose Monitoring Suppl W/DEVICE KIT Unknown  No Yes   Si kit daily   Diphenhydramine-Phenylephrine (DELSYM NIGHT TIME COUGH/COLD) 6.25-2.5 MG/5ML  LIQD Unknown  Yes Yes   Loratadine-Pseudoephedrine (ALLERGY RELIEF D-24 PO) Unknown  Yes Yes   MULTI-VITAMIN OR 2023 at 0600  Yes Yes   Si TABLET DAILY   amoxicillin-clavulanate (AUGMENTIN) 875-125 MG tablet 2023 at 1800  No Yes   Sig: Take 1 tablet by mouth 2 times daily   aspirin 81 MG tablet 2023 at 0600  Yes Yes   Sig: Take 1 tablet by mouth daily.   atorvastatin (LIPITOR) 80 MG tablet 2023 at 0600  No Yes   Sig: Take 1 tablet (80 mg) by mouth daily   azithromycin (ZITHROMAX) 250 MG tablet 2023 at 0600  No Yes   Sig: Take 2 tablets (500 mg) by mouth daily for 1 day, THEN 1 tablet (250 mg) daily for 4 days.   cholestyramine light (QUESTRAN) 4 GM packet 2023 at 1800  No Yes   Sig: TAKE ONE  PACKET (4G)BY MOUTH TWICE DAILY AS NEEDED   coenzyme Q-10 200 MG CAPS 2023 at 600  No Yes   Sig: Take 1 capsule by mouth daily   donepezil (ARICEPT) 10 MG tablet 2023 at 1800  No Yes   Sig: Take 1 tablet (10 mg) by mouth At Bedtime   glucose blood VI test strips strip Unknown  No Yes   Sig: by In Vitro route daily. For daily glucose testing.  Pt requests OneTouch Ultra Mini strips.   lisinopril (PRINIVIL,ZESTRIL) 5 MG tablet 2023 at 0600  No Yes   Sig: TAKE ONE TABLET BY MOUTH ONCE DAILY   metoprolol (TOPROL-XL) 100 MG 24 hr tablet 2023 at 0600  No Yes   Sig: Take 1 tablet (100 mg) by mouth daily      Facility-Administered Medications: None        Review of Systems    The 10 point Review of Systems is negative other than noted in the HPI or here.     Social History   I have reviewed this patient's social history and updated it with pertinent information if needed.  Social History     Tobacco Use    Smoking status: Former     Types: Cigarettes     Quit date: 2006     Years since quittin.7    Smokeless tobacco: Never   Substance Use Topics    Alcohol use: No    Drug use: No         Family History   I have reviewed this patient's family history and updated it with  pertinent information if needed.  Family History   Problem Relation Age of Onset    Alzheimer Disease Mother     C.A.D. Paternal Grandfather     Psychotic Disorder Paternal Grandfather         Physical Exam   Vital Signs: Temp: 98.1  F (36.7  C) Temp src: Oral BP: 130/66 Pulse: 92   Resp: 19 SpO2: 92 % O2 Device: Nasal cannula Oxygen Delivery: 4 LPM  Weight: 209 lbs 9.6 oz    Physical Exam  Vitals reviewed.   Constitutional:       General: He is not in acute distress.     Appearance: Normal appearance. He is not ill-appearing, toxic-appearing or diaphoretic.   HENT:      Head: Normocephalic and atraumatic.      Right Ear: External ear normal.      Left Ear: External ear normal.      Nose: Nose normal. No congestion or rhinorrhea.      Mouth/Throat:      Mouth: Mucous membranes are moist.   Eyes:      General: No scleral icterus.        Right eye: No discharge.         Left eye: No discharge.      Extraocular Movements: Extraocular movements intact.      Conjunctiva/sclera: Conjunctivae normal.      Pupils: Pupils are equal, round, and reactive to light.   Cardiovascular:      Rate and Rhythm: Normal rate and regular rhythm.      Pulses: Normal pulses.      Heart sounds: No murmur heard.     No friction rub. No gallop.   Pulmonary:      Effort: Pulmonary effort is normal. No respiratory distress.      Breath sounds: Rhonchi present. No wheezing.   Abdominal:      General: Bowel sounds are normal. There is no distension.      Palpations: Abdomen is soft. There is no mass.      Tenderness: There is no abdominal tenderness. There is no guarding or rebound.      Hernia: No hernia is present.   Musculoskeletal:         General: Normal range of motion.      Cervical back: Normal range of motion.      Right lower leg: No edema.      Left lower leg: No edema.   Skin:     General: Skin is warm and dry.      Capillary Refill: Capillary refill takes less than 2 seconds.   Neurological:      General: No focal deficit present.       Mental Status: He is alert and oriented to person, place, and time.      Cranial Nerves: No cranial nerve deficit.      Sensory: No sensory deficit.      Motor: No weakness.   Psychiatric:         Mood and Affect: Mood normal.         Behavior: Behavior normal.           Medical Decision Making       70 MINUTES SPENT BY ME on the date of service doing chart review, history, exam, documentation & further activities per the note.      Data     I have personally reviewed the following data over the past 24 hrs:    29.1 (H)  \   11.5 (L)   / 106 (L)     138 104 13.8 /  170 (H)   3.8 23 0.95 \     ALT: 20 AST: 24 AP: 94 TBILI: 0.6   ALB: 4.1 TOT PROTEIN: 6.9 LIPASE: N/A     Trop: 15 BNP: 519     Procal: N/A CRP: N/A Lactic Acid: 0.9         Imaging results reviewed over the past 24 hrs:   Recent Results (from the past 24 hour(s))   XR Chest Port 1 View    Narrative    EXAM: XR CHEST PORT 1 VIEW  LOCATION: [facility], [facility] within [facility]  DATE: 9/23/2023    INDICATION: Shortness of breath  COMPARISON: 01/23/2023      Impression    IMPRESSION: There are findings worrisome for active CHF/volume overload with slight cardiomegaly and mild pulmonary venous congestion. Probable minute left pleural effusion. Slight linear atelectasis in both lung bases. Postoperative changes sternotomy.   Port-A-Cath with tip in the lower SVC. Mild to moderate hypertrophic changes most marked in the thoracic spine. Partial loss involving the lateral aspect of the normally seen left hemidiaphragm presumed to represent changes of atelectasis.     Visit/Communication Style   Virtual (Video) communication was used to evaluate Fernando.  Fernando consented to the use of video communication: yes    Patient's location: [facility]   Provider's location during the visit: [facility]-medicine site

## 2023-09-24 NOTE — ED NOTES
Accessed power port R chest wall with 19 g 1 in needle. Flushed with 10 ML NS and blood return. No resistance or abnormality noted. Flushed well and Fluids started. Blood sent to lab.

## 2023-09-24 NOTE — TELEPHONE ENCOUNTER
S: Fever.  B: Patient gave writer permission to talk with his wife about his health    9/22 was DX with left upper lobe pneumonia.  Was given Amoxicillin and Azithromycin. Has a HX  of lung cancer.  Is taking Immunotherapy durvalumab  every 28 days (via his port).  Wearing O2 at 2 L.   Symptoms are:  This evening developed a fever  103.3 (oral)   (resting)  O2 saturation 91-93 % (resting and while on O2)  Has COPD started on Trelegy recently (not on Incruse)  Goes to [facility] and PCP Dr. Mann with [facility].    A: Per protocol to be seen within 4 hours.  Advise wife to have [facility] paged to see what there protocol is for fever.      R: Protocol and care advice reviewed. Wife verbalized understanding, in agreement with plan, and voiced no further questions. Call back with any new or worsening symptoms, concerns, or questions.    Deana Mcdaniel RN, [facility] Triage Nurse Advisor    Reason for Disposition   [1] Fever > 101 F (38.3 C) AND [2] age > 60 years    Additional Information   Negative: Shock suspected (e.g., cold/pale/clammy skin, too weak to stand, low BP, rapid pulse)   Negative: Difficult to awaken or acting confused (e.g., disoriented, slurred speech)     Has dementia   Negative: Bluish (or gray) lips or face now   Negative: New-onset rash with many purple (or blood-colored) spots or dots   Negative: Sounds like a life-threatening emergency to the triager   Negative: Fever > 103 F (39.4 C)   Negative: [1] Neutropenia known or suspected (e.g., recent cancer chemotherapy) AND [2] fever > 100.4 F (38.0 C)   Negative: [1] Neutropenia known or suspected (e.g., recent cancer chemotherapy) AND [2] signs or symptoms of suspected infection are present   Negative: [1] Headache AND [2] stiff neck (can't touch chin to chest)   Negative: Difficulty breathing   Negative: [1] Drinking very little AND [2] dehydration suspected (e.g., no urine > 12 hours, very dry mouth, very lightheaded)    Negative: Patient sounds very sick or weak to the triager  (Exception: Mild weakness and hasn't taken fever medicine.)    Protocols used: Cancer - Fever-A-AH

## 2023-09-25 NOTE — CONSULTS
Care Management Initial Consult    General Information  Assessment completed with: Patient,    Type of CM/SW Visit: Initial Assessment    Primary Care Provider verified and updated as needed: Yes   Readmission within the last 30 days:        Reason for Consult: discharge planning  Advance Care Planning:          Communication Assessment  Patient's communication style: spoken language (English or Bilingual)    Hearing Difficulty or Deaf: no   Wear Glasses or Blind: other (see comments) (cheaters)    Cognitive  Cognitive/Neuro/Behavioral: .WDL except  Level of Consciousness: alert, confused  Arousal Level: opens eyes spontaneously  Orientation: disoriented to, time, situation  Mood/Behavior: flat affect, cooperative  Best Language: 0 - No aphasia  Speech: slow, clear    Living Environment:   People in home: spouse     Current living Arrangements: house      Able to return to prior arrangements: yes       Family/Social Support:  Care provided by: spouse/significant other  Provides care for: no one  Marital Status:   Wife, Children  Erika       Description of Support System: Supportive, Involved    Support Assessment: Adequate family and caregiver support    Current Resources:   Patient receiving home care services: Yes  Skilled Home Care Services: Skilled Nursing, Physical Therapy  Community Resources: None  Equipment currently used at home: none  Supplies currently used at home: Oxygen Tubing/Supplies    Employment/Financial:  Employment Status: retired        Financial Concerns:         Does the patient's insurance plan have a 3 day qualifying hospital stay waiver?  No    Lifestyle & Psychosocial Needs:  Social Determinants of Health     Food Insecurity: Not on file   Depression: Not on file   Housing Stability: Not on file   Tobacco Use: Medium Risk (9/23/2023)    Patient History     Smoking Tobacco Use: Former     Smokeless Tobacco Use: Never     Passive Exposure: Not on file   Financial Resource Strain:  Not on file   Alcohol Use: Not on file   Transportation Needs: Not on file   Physical Activity: Not on file   Interpersonal Safety: Not on file   Stress: Not on file   Social Connections: Not on file       Functional Status:  Prior to admission patient needed assistance:   Dependent ADLs:: Independent, Ambulation-no assistive device  Dependent IADLs:: Transportation, Meal Preparation, Shopping, Laundry, Cooking, Cleaning       Mental Health Status:          Chemical Dependency Status:              Values/Beliefs:  Spiritual, Cultural Beliefs, Advent Practices, Values that affect care: no               Additional Information:  Referral received for discharge planning. Initial assessment completed with patient, at bedside.     Patient lives with his wife, Erika, in their own home. Patient states he ambulates independently at baseline without an assistive device.  He has home O2 that he uses, as needed. Patient has 4 steps to get into the home and another 14 steps inside the home, that he doesn't have to use.      Patient does not drive. Erika drives. Erika assists with grocery shopping, cooking, cleaning, etc. They have one daughter, Kristina, who lives in the [facility].    Patient currently receive home care through [facility] Phone: [phone] Fax: [phone], RN and P/T. He plans to resume this at discharge.    No other identified discharge needs.    CAYLA Estrada  [facility] [phone]/ Prashanth [phone]  Care Management

## 2023-09-25 NOTE — PLAN OF CARE
Goal Outcome Evaluation:       Patient overnight was alert and oriented with minimal confusion in evening, but increased confusion during nocturnal hours. Patient up with standby assist, voiding well. ON 4 LPM nasal cannula, O2 sats 92-94%, mild SOB and decrease in O2 sats with exertion. Blood sugar checked 123, and blood sugar order obtained d/t history of DM2. Heparin locked. Vanco and zosyn administered, tolerating well.

## 2023-09-25 NOTE — PROGRESS NOTES
Former[facility]    Medicine Progress Note - Hospitalist Service    Date of Admission:  9/23/2023    Assessment & Plan   Patient is an 83 year old gentleman with complex PMH including active metastatic non-small cell lung ca, CMML with chronic thrombocytopenia, former 75 pack year smoker, Moderate COPD (on intermittent 2L NC home oxygen with activity as needed) CAD and dementia presented to the ED with increasing shortness of breath and cough.  He was found to have acute on chronic respiratory failure with CT chest revealed left upper lobe pneumonia with small loculated parapneumonic effusion therefore he was admitted and placed on Vancomycin and zosyn for aggressive treatment.  He is starting to having clinical improvement with WBC decreasing and oxygen needs starting to lessen but continues to need ongoing inpatient management at this time     Acute on chronic hypoxic respiratory failure/CAP  Patient has been weaned from 4L to 2L NC continuously with improved but still diminished activity tolerance.   WBC decreased from peak of 34,000 to 27,700.  Procal 0.09.  Platelets 106,000 on admission as well.  The lactate was normal. COVID, flu, RSV were negative.  CT as above with small loculated parapneumonic effusion.  Blood cultures negative to date.   PLAN:  -Continue Zosyn and Vanco for now.  Obtain MRSA nares swab with consideration of Vanco discontinuation if negative. Anticipate antibiotic stewardship recommendations is upcoming days to narrow therapy further.  -continue home bronchodilator regimen  -titrate SaO2 88 to 94% (currently on 2 liters n/c continuously)  -pulmonary hygiene  -follow blood cultures  -follow markers of inflammation - CRP added to labs from this morning.    -If patient has clinical status worsening would have low threshold for repeat imaging, thora, drain, vs transfer for VATS     COPD without identified exacerbation   Patient with known COPD but not a known CO2  retainer and carbon dioxide normal on BMP.  Patient has not had acute wheezing or signs of acute flare and is not on steroids at this time.    PLAN:  -Continue bronchodilator regimen - substitue Breo Ellipta and Incruse ellipta for home Trelegy Ellipta given hospital formulary coverage.    -continue with goal sats of 88-94%  -obtain VBG tomorrow with next lab draw.    Leukocytosis, unspecified type  Patient has chronic mild leukocytosis due to CMML with WBC in the 8-14 range in the past few months, now increasing drastically to 34,000 secondary to acute pneumonia infection, now trending downward as above  PLAN:  -Continue treatment as above  -recheck tomorrow morning    Non-small call lung cancer on immunotherapy  Diagnosed with stage IIb disease in January 2023 and follows with [facility], .  underwent chemotherapy and radiation and is currently on immunotherapy treatment monthly.  Was not a candidate for resection given chronic medical complexity and need for thoracotomy for resection.  Due for next immunotherapy on 9/27/23 per wife.   PLAN:  -wife will call and cancel CT scan and immunotherapy appointment that was to occur this week  -patient will need follow up with [facility] provider following discharge to discuss follow up to ensure loculated effusion resolution following treatment and timing of next  CT scan and immunotherapy going forward    CMML  Diagnosed in 2018 via bone marrow biopsy and patient has had mild leukocytosis and thrombocytopenia, following with [facility]  PLAN:  -continue daily monitoring of WBC and platelets to ensure return to previous baselines  -outpatient follow up with oncology following discharge     CAD  Chronic HFpEF without exacerbation   Patient had known history of CAD with chest x-ray showing concern for venous congestion on imaging but patient without history of known CHF.  Echo today shows grade I diastolic dysfunction but normal EF.   PLAN:  -Continue BB and  "ACE  -Daily weights  -I/O's  -TKO port and avoid excess fluids  -monitor for any signs of angina        Diet: Consistent Carbohydrate Diet Moderate Consistent Carb (60 g CHO per Meal) Diet    DVT Prophylaxis: Enoxaparin (Lovenox) SQ  Andrews Catheter: Not present  Lines: PRESENT      Port a Cath 09/23/23 Right Chest wall-Site Assessment: WDL      Cardiac Monitoring: None  Code Status: Full Code      Clinically Significant Risk Factors          # Hypocalcemia: Lowest Ca = 8.4 mg/dL in last 2 days, will monitor and replace as appropriate       # Thrombocytopenia: Lowest platelets = 106 in last 2 days, will monitor for bleeding   # Hypertension: Noted on problem list     # Dementia: noted on problem list    # Obesity: Estimated body mass index is 34.19 kg/m  as calculated from the following:    Height as of this encounter: 1.676 m (5' 6\").    Weight as of this encounter: 96.1 kg (211 lb 12.8 oz)., PRESENT ON ADMISSION            Disposition Plan     Expected Discharge Date: 09/26/2023                  Verito Jose MD  Hospitalist Service  [facility]  Securely message with [employer] (more info)  Text page via [facility] Paging/Directory   ______________________________________________________________________    Interval History   Patient has been afebrile, oxygen has been weaned from 3.5-4L NC down to 2L NC in the past 24 hours and so far is being well tolerated.  Still decreased energy and appetite but improving.  Patient denies any new symptoms.  No new nursing concerns.     Physical Exam   Vital Signs: Temp: 98.3  F (36.8  C) Temp src: Oral BP: 115/59 Pulse: 82   Resp: 20 SpO2: 91 % O2 Device: Nasal cannula Oxygen Delivery: 2 LPM  Weight: 211 lbs 12.8 oz    Constitutional: awake, alert, cooperative, no apparent distress, and appears stated age  Respiratory: No increased work of breathing, decreased air exchange, crackles in bilateral bases, worse on right than left, upper lobs " clear   Cardiovascular: RRR  Musculoskeletal: no lower extremity pitting edema present  Neurologic: awake, alert, oriented to person and place and overall to situation.  Time was not evaluated.      Medical Decision Making       45 MINUTES SPENT BY ME on the date of service doing chart review, history, exam, documentation & further activities per the note.      Data     I have personally reviewed the following data over the past 24 hrs:    27.7 (H)  \   11.0 (L)   / 110 (L)     136 102 12.1 /  99   3.7 23 1.08 \     Trop: N/A BNP: 248     Procal: N/A CRP: 90.34 (H) Lactic Acid: 0.7

## 2023-09-26 NOTE — PLAN OF CARE
Goal Outcome Evaluation:           Overall Patient Progress: improvingOverall Patient Progress: improving    Outcome Evaluation: Pt alert but forgetful. VSS on 1L oxygen via nasal cannula. Noted to desat when sleeping/mouth breathing, and with exertion. Lungs clear, diminished in bases. Took a shower today. Port TKO.  Up in chair for meals. Ate well for breakfast, poor intakes for lunch and supper.

## 2023-09-26 NOTE — PHARMACY-VANCOMYCIN DOSING SERVICE
"Pharmacy Vancomycin Note  Date of Service 2023  Patient's  1940   83 year old, male    Indication: Sepsis  Day of Therapy: 3  Current vancomycin regimen:  1500 mg IV q24h  Current vancomycin monitoring method: AUC  Current vancomycin therapeutic monitoring goal: 400-600 mg*h/L    [employer]ight[employer] Prediction of Current Vancomycin Regimen  Loading dose: N/A  Regimen: 1500 mg IV every 24 hours.  Start time: 02:08 on 2023  Exposure target: AUC24 (range)400-600 mg/L.hr   AUC24,ss: 388 mg/L.hr  Probability of AUC24 > 400: 44 %  Ctrough,ss: 10.2 mg/L  Probability of Ctrough,ss > 20: 2 %  Probability of nephrotoxicity (Lodise JOZEF ): 6 %    Current estimated CrCl = Estimated Creatinine Clearance: 57.2 mL/min (based on SCr of 1.08 mg/dL).    Creatinine for last 3 days  2023: 10:43 PM Creatinine 1.10 mg/dL  2023:  6:04 AM Creatinine 0.95 mg/dL  2023:  5:19 AM Creatinine 1.08 mg/dL    Recent Vancomycin Levels (past 3 days)  2023:  1:11 AM Vancomycin 5.4 ug/mL    Vancomycin IV Administrations (past 72 hours)                     vancomycin (VANCOCIN) 1,500 mg in sodium chloride 0.9 % 250 mL intermittent infusion (mg) 1,500 mg New Bag 23 0209     1,500 mg New Bag 23 0158     1,500 mg New Bag 23 0329                    Nephrotoxins and other renal medications (From now, onward)      Start     Dose/Rate Route Frequency Ordered Stop    23 0230  vancomycin (VANCOCIN) 1,000 mg in 200 mL dextrose intermittent infusion         1,000 mg  200 mL/hr over 1 Hours Intravenous EVERY 12 HOURS 23 0210      23 0400  piperacillin-tazobactam (ZOSYN) 3.375 g vial to attach to  mL bag        Note to Pharmacy: For [facility], [facility] and [facility]: For Zosyn-naive patients, use the \"Zosyn initial dose + extended infusion\" order panel.    3.375 g  over 30 Minutes Intravenous EVERY 6 HOURS 23 0333                 Contrast Orders - past 72 hours (72h ago, onward)      None      "       Interpretation of levels and current regimen:  Vancomycin level is reflective of AUC less than 400    Has serum creatinine changed greater than 50% in last 72 hours: No    Urine output:  unable to determine    Renal Function: Stable    InsightRX Prediction of Planned New Vancomycin Regimen  Loading dose: N/A  Regimen: 1000 mg IV every 12 hours.  Start time: 02:08 on 09/26/2023  Exposure target: AUC24 (range)400-600 mg/L.hr   AUC24,ss: 510 mg/L.hr  Probability of AUC24 > 400: 89 %  Ctrough,ss: 16.6 mg/L  Probability of Ctrough,ss > 20: 25 %  Probability of nephrotoxicity (Lodise JOZEF 2009): 12 %    Plan:  Increase Dose to 1000 mg IV q12h  Vancomycin monitoring method: AUC  Vancomycin therapeutic monitoring goal: 400-600 mg*h/L  Pharmacy will check vancomycin levels as appropriate in 1-3 Days.  Serum creatinine levels will be ordered daily for the first week of therapy and at least twice weekly for subsequent weeks.    Wilfred Garrsion RPH

## 2023-09-26 NOTE — PLAN OF CARE
Goal Outcome Evaluation:      Plan of Care Reviewed With: patient, spouse, child    Overall Patient Progress: improvingOverall Patient Progress: improving    Outcome Evaluation: O2 sats maintaining parameters at 88-94% on 2L NC, requiring Oxymask when sleeping d/t snoring & mouthbreathing. up to chair for meals, ambulating into BR, with SBA, steady gait. O2 sats down to 80% RA after back to BR, recovered quickly on 2L NC. will continue with POC.

## 2023-09-26 NOTE — PROGRESS NOTES
[facility]    Medicine Progress Note - Hospitalist Service    Date of Admission:  9/23/2023    Assessment & Plan    Patient is an 83 year old gentleman with complex PMH including active metastatic non-small cell lung ca, CMML with chronic thrombocytopenia, former 75 pack year smoker, Moderate COPD (on intermittent 2L NC home oxygen with activity as needed) CAD and dementia presented to the ED with increasing shortness of breath and cough.  He was found to have acute on chronic respiratory failure with CT chest revealed left upper lobe pneumonia with small loculated parapneumonic effusion therefore he was admitted and placed on Vancomycin and zosyn for aggressive treatment.  He is starting to having clinical improvement with WBC decreasing and oxygen needs starting to lessen but continues to need ongoing inpatient management at this time    Patient has been weaned to 1L NC today with improvement on energy and appetite however CRP has increased slightly in the past 24 hours (90 up to 115 today) and WBC barely improved.  MRSA swab negative - discontinue Vanco and continue with Zosyn alone.  Will need ongoing inpatient hospitalization with recheck monitoring of CRP and WBC with consideration of repeat CT scan if patient does not have consistent improvement going forward.      Acute on chronic hypoxic respiratory failure/CAP  Patient has been weaned from 4L to 2L NC continuously with improved but still diminished activity tolerance.   WBC decreased from peak of 34,000 to 27,700.  Procal 0.09.  Platelets 106,000 on admission as well.  The lactate was normal. COVID, flu, RSV were negative.  CT as above with small loculated parapneumonic effusion.  Blood cultures negative to date.   PLAN:  -Continue Zosyn alone as outlined above.    -continue home bronchodilator regimen  -titrate SaO2 88 to 94% (currently on 2 liters n/c continuously)  -pulmonary hygiene  -follow blood cultures  -follow  markers of inflammation  -If patient has clinical status worsening would have low threshold for repeat imaging and consideration of thoracentesis, drain, vs transfer for VATS     COPD without identified exacerbation   Patient with known COPD but not a known CO2 retainer and carbon dioxide normal on BMP and VBG showing normal PCO2.  Patient has not had acute wheezing or signs of acute flare and is not on steroids at this time.    PLAN:  -Continue bronchodilator regimen - substitue Breo Ellipta and Incruse ellipta for home Trelegy Ellipta given hospital formulary coverage.    -continue with goal sats of 88-94%    Leukocytosis, unspecified type  Patient has chronic mild leukocytosis due to CMML with WBC in the 8-14 range in the past few months, now increasing drastically to 34,000 secondary to acute pneumonia infection, now trending downward as above but with minimal improvement in the last 24 hours  PLAN:  -Continue treatment as above  -recheck tomorrow morning    Non-small call lung cancer on immunotherapy  Diagnosed with stage IIb disease in January 2023 and follows with [facility],   underwent chemotherapy and radiation and is currently on immunotherapy treatment monthly.  Was not a candidate for resection given chronic medical complexity and need for thoracotomy for resection.  Due for next immunotherapy on 9/27/23 per wife which has now been canceled due to ongoing need for hospitalization  PLAN:  -patient will need follow up with [facility] provider following discharge to discuss follow up to ensure loculated effusion resolution following treatment and timing of next  CT scan and immunotherapy going forward    CMML  Diagnosed in 2018 via bone marrow biopsy and patient has had mild leukocytosis and thrombocytopenia, following with [facility]  PLAN:  -continue daily monitoring of WBC and platelets to ensure return to previous baselines  -outpatient follow up with oncology following discharge     CAD  Chronic  "HFpEF without exacerbation   Patient had known history of CAD with chest x-ray showing concern for venous congestion on imaging but patient without history of known CHF.  Echo today shows grade I diastolic dysfunction but normal EF.   PLAN:  -Continue BB and ACE  -Daily weights  -I/O's  -TKO port and avoid excess fluids  -monitor for any signs of angina        Diet: Regular Diet Adult    DVT Prophylaxis: Enoxaparin (Lovenox) SQ  Andrews Catheter: Not present  Lines: PRESENT      Port a Cath 09/23/23 Right Chest wall-Site Assessment: WDL      Cardiac Monitoring: None  Code Status: Full Code      Clinically Significant Risk Factors                # Thrombocytopenia: Lowest platelets = 110 in last 2 days, will monitor for bleeding   # Hypertension: Noted on problem list     # Dementia: noted on problem list    # Obesity: Estimated body mass index is 33.86 kg/m  as calculated from the following:    Height as of this encounter: 1.676 m (5' 6\").    Weight as of this encounter: 95.2 kg (209 lb 12.8 oz)., PRESENT ON ADMISSION            Disposition Plan   Anticipate discharge home in 2 to 3 days if patient clinically improves versus consideration of transfer if patient worsens       Verito Jose MD  Hospitalist Service  [facility]  Securely message with [employer] (more info)  Text page via [facility] Paging/Directory   ______________________________________________________________________    Interval History   Patient has been afebrile, has been weaning down on O2 supplementation and has needed 1-1 and half liters which is less than the 2 to 4 L he was needing yesterday.  He has ongoing shortness of breath with activity but improved overall activity tolerance.  Appetite is still poor but also improving.  He denies any new symptoms and states he feels he would recover better at home from this point on and is frustrated by his ongoing need for hospitalization.  He denies any new symptoms.  " No new nursing concerns.    Physical Exam   Vital Signs: Temp: 98.3  F (36.8  C) Temp src: Oral BP: 101/61 Pulse: 90   Resp: 19 SpO2: 90 % O2 Device: Oxymask Oxygen Delivery: 1.5 LPM  Weight: 209 lbs 12.8 oz    Constitutional: awake, alert, cooperative, no apparent distress, and appears stated age  Respiratory: No increased work of breathing, patient has ongoing decreased breath sounds especially in bilateral bases this with mild crackles present on the left more than right.  No wheezing auscultated  Cardiovascular: Regular rate and rhythm  GI: Bowel sounds present, abdomen is soft, nondistended, nontender  Musculoskeletal: no lower extremity pitting edema present  Neurologic: Awake, alert, oriented to person, place, and overall to situation but does not seem to grasp the potential seriousness of parapneumonic effusions especially if loculated    Medical Decision Making       40 MINUTES SPENT BY ME on the date of service doing chart review, history, exam, documentation & further activities per the note.      Data     I have personally reviewed the following data over the past 24 hrs:    26.3 (H)  \   11.7 (L)   / 117 (L)     136 102 10.4 /  103 (H)   4.0 24 0.97 \     Procal: N/A CRP: 114.96 (H) Lactic Acid: 0.9

## 2023-09-26 NOTE — PLAN OF CARE
Goal Outcome Evaluation:           Overall Patient Progress: improvingOverall Patient Progress: improving    Pt O2 maintained at 88%-94% on 1-1.5LPM oxymask. Pt flagged for lactic, result came back 0.9, Vanco level-5.4. Vanco modified to 1000mg q12. Tolerated zosyn. IV running @ 10. Heparin locked. CRP-114.96

## 2023-09-27 NOTE — PROGRESS NOTES
[facility]    Medicine Progress Note - Hospitalist Service    Date of Admission:  9/23/2023    Assessment & Plan     Patient is an 83 year old gentleman with complex PMH including active metastatic non-small cell lung ca, CMML with chronic thrombocytopenia, former 75 pack year smoker, Moderate COPD (on intermittent 2L NC home oxygen with activity as needed) CAD and dementia presented to the ED with increasing shortness of breath and cough.  He was found to have acute on chronic respiratory failure with CT chest revealed left upper lobe pneumonia with small loculated parapneumonic effusion therefore he was admitted and placed on Vancomycin and zosyn for aggressive treatment.  MRSA swab negative and patient has been narrowed to Zosyn alone with ongoing slow improvement.  He will need ongoing inpatient hospitalization with recheck monitoring of CRP and WBC with consideration of repeat CT scan if patient does not have consistent improvement going forward.      Acute on chronic hypoxic respiratory failure/CAP  Patient has been weaned from 4L to 1-1.5L NC continuously with improved activity tolerance   WBC decreased from peak of 34,000 to 24,200.  Procal 0.09.  Platelets 106,000 on admission as well, now increased to 127,000.  CRP improving from peak of 114.96 down to 89.13 today.   The lactate was normal. COVID, flu, RSV were negative.  CT as above with small loculated parapneumonic effusion.  Blood cultures negative to date.   PLAN:  -Continue Zosyn alone as outlined above.    -continue home bronchodilator regimen  -titrate SaO2 88 to 94% (currently on 2 liters n/c continuously)  -pulmonary hygiene  -follow blood cultures  -follow markers of inflammation  -If patient has clinical status worsening would have low threshold for repeat imaging and consideration of thoracentesis, drain, vs transfer for VATS   -possible discussion with DEMETRIUS rajput tomorrow if patient continues to have  improvement tomorrow with hopeful discharge in next 2-3 days.    COPD without identified exacerbation   Patient with known COPD but not a known CO2 retainer and carbon dioxide normal on BMP and VBG showing normal PCO2.  Patient has not had acute wheezing or signs of acute flare and is not on steroids at this time.    PLAN:  -Continue bronchodilator regimen - substitue Breo Ellipta and Incruse ellipta for home Trelegy Ellipta given hospital formulary coverage.    -continue with goal sats of 88-94%    Leukocytosis, unspecified type  Patient has chronic mild leukocytosis due to CMML with WBC in the 8-14 range in the past few months, now increasing drastically to 34,000 secondary to acute pneumonia infection, now trending downward to 24,200  PLAN:  -Continue treatment as above  -recheck tomorrow morning    Non-small call lung cancer on immunotherapy  Diagnosed with stage IIb disease in January 2023 and follows with [facility], underwent chemotherapy and radiation and is currently on immunotherapy treatment monthly.  Was not a candidate for resection given chronic medical complexity and need for thoracotomy for resection.  Due for next immunotherapy today per wife which has now been canceled due to ongoing need for hospitalization  PLAN:  -patient will need follow up with [facility] provider following discharge to discuss follow up to ensure loculated effusion resolution following treatment and timing of next CT scan and immunotherapy going forward    CMML  Diagnosed in 2018 via bone marrow biopsy and patient has had mild leukocytosis and thrombocytopenia, following with [facility]  PLAN:  -continue daily monitoring of WBC and platelets to ensure return to previous baselines  -outpatient follow up with oncology following discharge     CAD  Chronic HFpEF without exacerbation   Patient had known history of CAD with chest x-ray showing concern for venous congestion on imaging but patient without history of known CHF.   "Echo today shows grade I diastolic dysfunction but normal EF.   PLAN:  -Continue BB and ACE  -Daily weights  -I/O's  -TKO port and avoid excess fluids  -monitor for any signs of angina        Diet: Regular Diet Adult    DVT Prophylaxis: Enoxaparin (Lovenox) SQ  Andrews Catheter: Not present  Lines: PRESENT      Port a Cath 09/23/23 Right Chest wall-Site Assessment: WDL      Cardiac Monitoring: None  Code Status: Full Code      Clinically Significant Risk Factors                # Thrombocytopenia: Lowest platelets = 117 in last 2 days, will monitor for bleeding   # Hypertension: Noted on problem list     # Dementia: noted on problem list    # Obesity: Estimated body mass index is 33.75 kg/m  as calculated from the following:    Height as of this encounter: 1.676 m (5' 6\").    Weight as of this encounter: 94.8 kg (209 lb 1.6 oz)., PRESENT ON ADMISSION            Disposition Plan   Hopeful discharge home in 2-3 days       Verito Jose MD  Hospitalist Service  [facility]  Securely message with [employer] (more info)  Text page via [email or web address] Paging/Directory   ______________________________________________________________________    Interval History   Patient has been afebrile, oxygen has been on 1-2L NC, energy and appetite improved and patient is wanting to go home soon.  No new symptoms.  No new nursing concerns     Physical Exam   Vital Signs: Temp: 98.1  F (36.7  C) Temp src: Oral BP: 115/63 Pulse: 97   Resp: 20 SpO2: 91 % O2 Device: Nasal cannula Oxygen Delivery: 1.5 LPM  Weight: 209 lbs 1.6 oz    Constitutional: awake, alert, cooperative, no apparent distress, and appears stated age  Respiratory: No increased work of breathing, decreased air exchange, crackles in bilateral lower bases, no wheezing   Cardiovascular: RRR  GI: bowel sounds present, abdomen soft and non-tender  Musculoskeletal: no lower extremity pitting edema present  Neurologic: Awake, alert, oriented to name, " place and overall to situation however openly admits he has issues with his memory and can't remember the details of why his pneumonia is so complicated and why he needs to stay.      Medical Decision Making       40 MINUTES SPENT BY ME on the date of service doing chart review, history, exam, documentation & further activities per the note.      Data     I have personally reviewed the following data over the past 24 hrs:    24.2 (H)  \   11.4 (L)   / 127 (L)     137 103 8.7 /  107 (H)   3.8 24 0.93 \     Procal: N/A CRP: 89.13 (H) Lactic Acid: 0.7

## 2023-09-27 NOTE — PLAN OF CARE
"Goal Outcome Evaluation:      Plan of Care Reviewed With: patient, spouse    Overall Patient Progress: improvingOverall Patient Progress: improving    Outcome Evaluation: Alert and oriented x4. Vague at times. Received Zosyn. Crackles in bases per auscultation. No cough. Denied pain. Voiding. Up in chair for meals. Oxygen per NC at 1.5 liters to sat 90-91%. States he is feeling \"pretty good\".      "

## 2023-09-27 NOTE — PROGRESS NOTES
VSS. Flagged for lactic, checked lactic-0.9. O2 sat maintained between 88%- 94% at 2-3 LPM oxymask. CRP down to 89.13. Port TKO, heparin flushed.

## 2023-09-27 NOTE — PLAN OF CARE
Goal Outcome Evaluation:      Plan of Care Reviewed With: patient    Overall Patient Progress: improvingOverall Patient Progress: improving    Outcome Evaluation: Fine crackles in lung bases. Rare cough; stated he has a bit of phlegm that he cannot cough up. Afebrile. Flagged for lactic but results were 0.7. On 1.5 liters to sat 91%. Ate 100% of supper. Chair and bed alarms on.

## 2023-09-28 NOTE — PROGRESS NOTES
[facility]    Medicine Progress Note - Hospitalist Service    Date of Admission:  9/23/2023    Assessment & Plan     Patient is an 83 year old gentleman with complex PMH including active metastatic non-small cell lung ca, CMML with chronic thrombocytopenia, former 75 pack year smoker, Moderate COPD (on intermittent 2L NC home oxygen with activity as needed) CAD and dementia presented to the ED with increasing shortness of breath and cough.  He was found to have acute on chronic respiratory failure with CT chest revealed left upper lobe pneumonia with small loculated parapneumonic effusion therefore he was admitted and placed on Vancomycin and zosyn for aggressive treatment.  MRSA swab negative and patient has been narrowed to Zosyn alone with ongoing slow improvement but in the past 24 hours patient has had stabilization of oxygen needs and WBC improvement.  Course additionally complicated by patient having CMML - on review of [place] chart WBC have been in the 8-13 range earlier this year but through [facility] it was found that patient had WBC in 7/23 in the 20-24 range, however complicated by administration of steroids and no repeat WBC following steroid completion to evaluate for recent baseline from CMML effect.      Discussed with ID provider as well as patient and wife - patient will transition to Unasyn for ongoing IV antibiotics given lack of pseudomonas risk factors and will plan to continue monitoring WBC and CRP daily.  Will perform repeat CT scan tomorrow morning and if ongoing parapneumonic effusion is present will discuss with radiologist on site if this is something that can be drained locally or if patient would need transfer.  Of note, if not drained tomorrow, no radiology on site at this facility until 10/3/23.  Patient and wife in agreement of this plan.      Acute on chronic hypoxic respiratory failure/CAP  Patient has been weaned from 4L to 1-1.5L NC continuously  with improved activity tolerance overall.   WBC decreased from peak of 34,000 to 24,000 however has had minimal improvement in the past 2 days.  Procal 0.09.  Platelets 106,000 on admission as well, now increased to 149,000.  CRP improving from peak of 114.96 down to 71.69 today.  COVID, flu, RSV were negative.  CT as above with small loculated parapneumonic effusion.  Blood cultures negative to date.   PLAN:  -Transition to Unasyn as recommended by DEMETRIUS rajput   -repeat CBC and CRP daily  -Repeat CT scan tomorrow - may need thoracentesis vs VATS if effusion still present and depending on appearance.    -continue home bronchodilator regimen  -titrate SaO2 88 to 94% (currently on 1-2 liters n/c continuously)  -pulmonary hygiene  -follow blood cultures    COPD without identified exacerbation   Patient with known COPD but not a known CO2 retainer and carbon dioxide normal on BMP and VBG showing normal PCO2.  Patient has not had acute wheezing or signs of acute flare and is not on steroids at this time.    PLAN:  -Continue bronchodilator regimen - substitue Breo Ellipta and Incruse ellipta for home Trelegy Ellipta given hospital formulary coverage.    -continue with goal sats of 88-94%    Leukocytosis, unspecified type  Patient has chronic mild leukocytosis due to CMML with WBC in the 8-14 range earlier this year in [place] chart but was 16-24 in [facility] chart review in 7/23 while patient was on steroids.  Was increased to 34,000 secondary to acute pneumonia infection at start of this stay, now trending downward to 24,000 but unclear if this is patient's chronic baseline.   PLAN:  -Continue treatment as above  -recheck tomorrow morning    Non-small call lung cancer on immunotherapy  Diagnosed with stage IIb disease in January 2023 and follows with [facility], underwent chemotherapy and radiation and is currently on immunotherapy treatment monthly.  Was not a candidate for resection given chronic medical complexity and  "need for thoracotomy for resection.  Next immunotherapy had been due 9/27/23 which was canceled due to ongoing need for hospitalization  PLAN:  -patient will need follow up with [facility] provider following discharge to discuss follow up to ensure loculated effusion resolution following treatment and timing of next CT scan and immunotherapy going forward    CMML  Diagnosed in 2018 via bone marrow biopsy and patient has had mild leukocytosis and thrombocytopenia, following with [facility] - unclear recent WBC baseline  PLAN:  -continue daily monitoring of WBC and platelets   -outpatient follow up with oncology following discharge     CAD  Chronic HFpEF without exacerbation   Patient had known history of CAD with chest x-ray showing concern for venous congestion on imaging but patient without history of known CHF.  Echo today shows grade I diastolic dysfunction but normal EF.   PLAN:  -Continue BB and ACE  -Daily weights  -I/O's  -TKO port and avoid excess fluids  -monitor for any signs of angina        Diet: Regular Diet Adult    DVT Prophylaxis: Enoxaparin (Lovenox) SQ  Andrews Catheter: Not present  Lines: PRESENT      Port a Cath 09/23/23 Right Chest wall-Site Assessment: WDL      Cardiac Monitoring: None  Code Status: Full Code      Clinically Significant Risk Factors                # Thrombocytopenia: Lowest platelets = 127 in last 2 days, will monitor for bleeding   # Hypertension: Noted on problem list     # Dementia: noted on problem list    # Obesity: Estimated body mass index is 33.51 kg/m  as calculated from the following:    Height as of this encounter: 1.676 m (5' 6\").    Weight as of this encounter: 94.2 kg (207 lb 9.6 oz).             Disposition Plan   Anticipate discharge home in 2-4 days depending on clinical course and CT scan results        Verito Jose MD  Hospitalist Service  [facility]  Securely message with [employer] (more info)  Text page via [employer] " Paging/Directory   ______________________________________________________________________    Interval History   Patient has been afebrile, has been on 1 to 3 L over the past 2 hours however when awake is normally on 1 to 1-1/2 L, increasing to 2 L with sleep.  Activity tolerance continues to improve and patient is feeling much better and was hopeful to go home today and is disappointed he cannot.  He denies any new symptoms.  No new nursing concerns.    Physical Exam   Vital Signs: Temp: 97.9  F (36.6  C) Temp src: Oral BP: 117/65 Pulse: 90   Resp: 18 SpO2: 90 % O2 Device: Nasal cannula Oxygen Delivery: 2 LPM  Weight: 207 lbs 9.6 oz    Constitutional: awake, alert, cooperative, no apparent distress, and appears stated age  Respiratory: ongoing crackles in bilateral bases, worse in left than right.  No wheezing.  No increased work of breathing   Cardiovascular: RRR  GI: bowel sounds present, abdomen soft and non-tender  Musculoskeletal: no lower extremity pitting edema present  Neurologic: awake, alert, oriented to person, place and overall to situation but not on details and is always surprised when informed he is not medically ready for discharge yet     Medical Decision Making       45 MINUTES SPENT BY ME on the date of service doing chart review, history, exam, documentation & further activities per the note.      Data     I have personally reviewed the following data over the past 24 hrs:    24.0 (H)  \   11.8 (L)   / 149 (L)     135 101 8.9 /  136 (H)   3.6 25 0.91 \     Procal: N/A CRP: 71.69 (H) Lactic Acid: 1.1

## 2023-09-28 NOTE — PLAN OF CARE
Goal Outcome Evaluation:      Plan of Care Reviewed With: patient    Overall Patient Progress: improvingOverall Patient Progress: improving    Outcome Evaluation: Fine crackles in bases of lungs. Weaned oxygen down to 1 liter, but desatted with activity down to low 80's when on 1.5 liters. Unproductive loose cough at times. CRP was down to 71.69 this morning. Afebrile. Has denied pain. Ate well for meals while sitting up in chair. Will do home oxygen assessment this afternoon. Bed and chair alarms on.

## 2023-09-28 NOTE — PROGRESS NOTES
Care Management Follow Up    Length of Stay (days): 4    Expected Discharge Date:  9/30/23     Concerns to be Addressed:       Patient plan of care discussed at interdisciplinary rounds: Yes    Anticipated Discharge Disposition: Home, Home Care  Disposition Comments: External PCP  Anticipated Discharge Services:    Anticipated Discharge DME:      Patient/family educated on Medicare website which has current facility and service quality ratings:    Education Provided on the Discharge Plan:    Patient/Family in Agreement with the Plan: yes    Referrals Placed by CM/SW: External Care Coordination, Homecare  Private pay costs discussed: Not applicable    Additional Information:  Plan remains for patient to discharge home with his wife when medically stable.    Patient will resume [facility] Phone: [phone] Fax: [phone] for SN and P/T at discharge.    Family transport.    CAYLA Estrada  [facility] [phone]/ Prashanth [phone]  Care Management

## 2023-09-28 NOTE — PROGRESS NOTES
Infectious Disease [facility] Note  I was called by Dr. Jose on 9/28/2023 at 9:45 AM to provide input for Fernando Bermudez MRN [id number]. The patient is located at [facility]. The nature of this request for a [facility] consultation does not permit me to perform a comprehensive review of health care records, patient/family interview, nor an examination of the patient. I obtained limited patient information from the provider on the phone call and a limited chart review.    Based on only the information I was provided today, I make the following recommendations to the treating provider/team for their review and consideration. Pt with a hx of  active metastatic non-small cell lung ca, CMML with chronic thrombocytopenia, former 75 pack year smoker, Moderate COPD who is here for acute hypoxic resp failure secondary to a CAP. CT on 9/22/23 showed likely left upper lobe pneumonia with associated small, loculated parapneumonic effusion. He was started vanco and zosyn. Vanco was stopped once MRSA nares was negative so he is now on zoysn. His WBC started to trend down from 34K to 24 yesterday but did not significantly improve today. I was called for further recs. He is still on zoysn. I think given his lung cancer, post obstructive pna is a concern and he will need anaerobic coverage. However, he has no hx of PsA so we can narrow from zosyn to unasyn. I will continue abx and monitor his leukocytosis. If it fails do decrease from 24K over the next 48hrs, then I will recommend repeat imaging to re-evaluate the he loculated parapneumonic effusion. If present, please talk to radiology to see if it can be drained.     These recommendations are not intended to take the place of the care team's clinical judgement, which should always be utilized to provide the most appropriate care to meet the unique needs of each patient. The recommendations offered were based on the limited scope of information provided  as today's date. Should additional guidance be needed or required a formal consultation with infectious diseases is recommended.    *Primary team: If a patient is discharged on IV antibiotics it is not the responsibility of the ID curbside provider to monitor labs or sign for antibiotic orders unless it is otherwise written by the curbside provider. If further outpatient management is required then place an outpatient ID consult and call [phone] to facilitate making an ID appointment before discharge.

## 2023-09-28 NOTE — PLAN OF CARE
Goal Outcome Evaluation:      Plan of Care Reviewed With: patient    Overall Patient Progress: improvingOverall Patient Progress: improving    Outcome Evaluation: Pt A&Ox3. AM Pot 3.6, no replacement ordered per protocol. Oxygen applied 1.5-3L, Pt desat less frequent with oxy mask while sleeping. Ambulating SBA. Port NS infusing at TKO rate. LS fine crackles & diminished.

## 2023-09-29 PROBLEM — C77.1: Status: ACTIVE | Noted: 2023-01-01

## 2023-09-29 PROBLEM — C92.10 CHRONIC MYELOID LEUKEMIA (H): Status: ACTIVE | Noted: 2023-01-01

## 2023-09-29 PROBLEM — Z95.828 PORT-A-CATH IN PLACE: Status: ACTIVE | Noted: 2023-01-01

## 2023-09-29 PROBLEM — J90 PLEURAL EFFUSION ON LEFT: Status: ACTIVE | Noted: 2023-01-01

## 2023-09-29 PROBLEM — R65.10 SIRS (SYSTEMIC INFLAMMATORY RESPONSE SYNDROME) (H): Status: ACTIVE | Noted: 2023-01-01

## 2023-09-29 PROBLEM — J96.21 ACUTE ON CHRONIC RESPIRATORY FAILURE WITH HYPOXIA (H): Status: ACTIVE | Noted: 2023-01-01

## 2023-09-29 PROBLEM — D84.9 IMMUNOCOMPROMISED (H): Status: ACTIVE | Noted: 2023-01-01

## 2023-09-29 NOTE — PROGRESS NOTES
[facility]    Medicine Progress Note - Hospitalist Service    Date of Admission:  9/23/2023    Assessment & Plan   Patient is an 83 year old man with active metastatic non-small cell lung ca being treated with immunomodulatory therapy, CMML with chronic thrombocytopenia, moderate COPD with chronic hypoxic respiratory failure, CAD with remote history of CABG, and dementia who presented to the ED with increasing shortness of breath and cough and was admitted due to concerns for acute on chronic respiratory failure and left upper lobe pneumonia     Community-acquired pneumonia  Left pleural effusion  Acute on chronic hypoxic respiratory failure  Probable immunocompromise  SIRS, possible sepsis  Presented with symptoms and signs typical for pneumonia and new radiographic infiltrate in the left upper lobe as well as small loculated left pleural effusion.  Also presented with worsening leukocytosis compared with baseline, tachypnea, and tachycardia concerning for SIRS.  In context of suspected infection, SIRS would be worrisome for sepsis.  He had worsening hypoxia compared with baseline necessitating more oxygen supplementation than normal.  His chronic respiratory failure is usually treated with 2 L nasal cannula during exertion but not at rest.  Procalcitonin was minimally elevated at 0.09, but CRP was elevated at 115.  He had worsening thrombocytopenia compared with baseline with initial platelets 106 on admission.  Specific causative organism has not been identified after negative testing for COVID, influenza, and RSV, negative MRSA testing, and negative blood cultures so far.  CT demonstrated small loculated left pleural effusion that had also been present in July 2023.  Initial test results of pleural fluid after diagnostic thoracentesis 9/29 are not strongly suspicious for empyema.  Although parapneumonic effusion is possible, malignant pleural effusion is a consideration and the  effusion was grossly bloody.  He is probably immunocompromised while receiving immunomodulatory therapy (last administered August 30) and could be at risk for unusual infection such as fungal infection.  He is not known to have had TB previously.  -Transition from Unasyn to Augmentin   -repeat WBC and CRP   -titrate and wean oxygen supplementation to keep SaO2 88 to 94%  -Continue pulmonary hygiene  -Recommended outpatient follow-up with his oncologist to review results of cytology testing of pleural fluid once they are available    Coagulopathy  INR prolonged at 1.19 concerning for coagulopathy.  He is at increased risk for bleeding, but bleeding has not been clinically evident (aside from grossly bloody pleural effusion).  Coagulopathy could be due to inadequate nutritional intake but could also be due to acute organ dysfunction and sepsis.  Use of Lovenox during hospitalization could conceivably affect INR.  -Monitor INR as indicated    Metastatic non-small call lung cancer on immunotherapy  Diagnosed with stage IIb disease in January 2023 with left upper lobe primary tumor and intrathoracic lymph node metastasis.  He follows with [facility] and after initial chemotherapy and radiation he has been maintained on durvalumab immunotherapy treatment monthly (last given August 30).  Next immunotherapy was due 9/27/23 but was not administered due to current hospitalization and concern for infection.  He was hospitalized in July for pneumonitis attributed to immunomodulatory therapy and was treated with corticosteroids but not antibiotics.  During that hospitalization, he was noted to have left pleural effusion.  -Recommended close outpatient follow up with [facility] provider following discharge     CML  CML was diagnosed in 2018 after bone marrow biopsy and patient has had chronic mild leukocytosis and thrombocytopenia since then and is following with [facility].  Per medical records, WBC ranged 8-14 earlier this  year but 16-24 in 7/23 when he was being treated with corticosteroids.  Admission WBC was 34, significantly higher than previously raising concern for acute infection.  Recent baseline platelet count is not known.  Platelet was 106 at admission and has steadily improved since admission and today is normal.  -Ordered monitoring of WBC and platelets   -outpatient follow up with oncology following discharge     COPD without exacerbation   Patient with known COPD with chronic exertional hypoxia but no hypercapnia.  His chronic exertional hypoxia is managed with exertional use of oxygen supplementation at his normal baseline.  Patient has not had acute wheezing or signs of acute COPD flare and is not being treated with steroids at this time.    -Continue chronic bronchodilator regimen but substituted Breo Ellipta and Incruse ellipta for home Trelegy Ellipta due to hospital formulary .    -Titrate and wean oxygen supplementation to achieve goal sats of 88-94%    CAD  Previous CABG  Hypertension  Diastolic dysfunction  Patient had known history of CAD and CABG many years ago.  He has hypertension chronically treated with beta-blocker and ACE inhibitor.  Initial chest x-ray demonstrated venous congestion, but BNP was normal and patient has no known history of CHF.  Echo during this hospitalization demonstrated grade I diastolic dysfunction but normal EF.  Acute CHF has not been suspected clinically.  -Continue chronic doses of BB and ACE  -Daily weights  -I/O's  -TKO port and avoid excess fluids  -monitor for any signs of angina     Dementia  Chronic and clinically stable.  -Continue chronic doses of Aricept and Namenda  -Anticipate return to home living situation once medically stable    Obesity  Chronic with BMI 33  -No acute intervention       Diet: Regular Diet Adult    DVT Prophylaxis: Enoxaparin (Lovenox) SQ  Andrews Catheter: Not present  Lines: PRESENT      Port a Cath 09/23/23 Right Chest wall-Site Assessment: SURJIT     "  Cardiac Monitoring: None  Code Status: Full Code      Clinically Significant Risk Factors               # Coagulation Defect: INR = 1.19 (Ref range: 0.85 - 1.15) and/or PTT = N/A, will monitor for bleeding    # Hypertension: Noted on problem list     # Dementia: noted on problem list    # Obesity: Estimated body mass index is 33.36 kg/m  as calculated from the following:    Height as of this encounter: 1.676 m (5' 6\").    Weight as of this encounter: 93.8 kg (206 lb 11.2 oz).             Disposition Plan    anticipate discharge home once medically stable, possibly 1 to 2 days       Francisco Polanco MD  Hospitalist Service  [facility]  Securely message with [employer] (more info)  Text page via [facility] Paging/Directory   ______________________________________________________________________    Interval History   There were no significant overnight events.  Patient offers no complaints today.  He says he generally feels better.  He remains afebrile and hemodynamically stable.  Oxygenation has improved with decreasing oxygen requirement.  He has had good urine output.  He is tolerating adequate oral intake and advancing activity.    Additional information was obtained by review of the chart and discussion with the patient and his spouse.  They report that he last received his dose and immunomodulatory therapy on August 30.  He has no previous history of tuberculosis.  He has no previous history of congestive heart failure.    Physical Exam   Vital Signs: Temp: 98.2  F (36.8  C) Temp src: Oral BP: 139/74 Pulse: 89   Resp: 19 SpO2: 92 % O2 Device: Nasal cannula Oxygen Delivery: 1 LPM  Patient Vitals for the past 24 hrs:   BP Temp Temp src Pulse Resp SpO2 Weight   09/29/23 1137 -- -- -- -- -- 92 % --   09/29/23 1136 139/74 -- -- 89 -- -- --   09/29/23 1113 -- -- -- -- -- 92 % --   09/29/23 1100 120/68 -- -- 90 -- 91 % --   09/29/23 1045 135/61 -- -- 90 -- 92 % --   09/29/23 1030 114/69 98.2  F " (36.8  C) Oral 91 19 92 % --   09/29/23 1020 123/73 -- -- 91 -- 90 % --   09/29/23 0932 -- -- -- -- -- (!) 84 % --   09/29/23 0732 120/67 98.1  F (36.7  C) Axillary 95 18 90 % --   09/29/23 0441 -- -- -- -- -- 91 % --   09/29/23 0352 -- -- -- -- -- 94 % --   09/29/23 0309 121/65 98.9  F (37.2  C) Oral 91 18 97 % --   09/29/23 0130 -- -- -- -- -- -- 93.8 kg (206 lb 11.2 oz)   09/29/23 0108 -- -- -- -- -- (!) 88 % --   09/28/23 2332 126/66 98.8  F (37.1  C) Oral 96 16 91 % --   09/28/23 2239 -- -- -- -- -- 90 % --   09/28/23 2108 -- -- -- -- -- 91 % --   09/28/23 2106 -- -- -- -- -- (!) 85 % --   09/28/23 2021 -- -- -- -- -- 91 % --   09/28/23 2000 -- -- -- -- -- 90 % --   09/28/23 1904 129/70 98  F (36.7  C) Oral 85 18 90 % --   09/28/23 1759 134/68 98.4  F (36.9  C) Oral 96 18 90 % --   09/28/23 1619 (!) 140/75 98.3  F (36.8  C) Oral 92 20 91 % --     Weight: 206 lbs 11.2 oz  Vitals:    09/25/23 0145 09/26/23 0647 09/27/23 1116 09/28/23 0249   Weight: 96.1 kg (211 lb 12.8 oz) 95.2 kg (209 lb 12.8 oz) 94.8 kg (209 lb 1.6 oz) 94.2 kg (207 lb 9.6 oz)    09/29/23 0130   Weight: 93.8 kg (206 lb 11.2 oz)       Intake/Output Summary (Last 24 hours) at 9/29/2023 1608  Last data filed at 9/29/2023 1429  Gross per 24 hour   Intake 936 ml   Output 2615 ml   Net -1679 ml       General Appearance: Obese elderly man without signs of acute distress while sitting up in bed  Respiratory: Normal respiratory effort, prominent inspiratory crackles at the lung bases bilaterally, no wheezes  Cardiovascular: Regular rate and rhythm, good radial pulse, normal capillary refill, trace peripheral edema in the lower legs  Neuro: Alert and appears to maintain wakefulness, obvious memory problems    Medical Decision Making       69 MINUTES SPENT BY ME on the date of service doing chart review, history, exam, documentation & further activities per the note.  NOTE(S)/MEDICAL RECORDS REVIEWED over the past 24 hours: Reviewed note from office visit  with pulmonology on August 24 and hospital discharge summary from hospitalization July 19 to July 22.  During that hospitalization, he was treated for pneumonitis that was attributed to immunomodulatory therapy.  He received doses of IV Lasix during that hospitalization and was discharged on corticosteroids.  During that hospitalization, he was noted to have trace left pleural effusion that was new.       Data     I have personally reviewed the following data over the past 24 hrs:    26.3 (H)  \   11.8 (L)   / 184     139 101 10.8 /  149 (H)   3.8 24 0.90 \     ALT: N/A AST: N/A AP: N/A TBILI: N/A   ALB: N/A TOT PROTEIN: 6.5 LIPASE: N/A     Procal: N/A CRP: 61.24 (H) Lactic Acid: 1.0       INR:  1.19 (H) PTT:  N/A   D-dimer:  N/A Fibrinogen:  N/A     Ferritin:  N/A % Retic:  N/A LDH:  238       For comparison, CRP yesterday was 72  Pleural fluid WBC 1489 with 70% neutrophils, pleural fluid was grossly bloody and cloudy with pH 8.0, pleural fluid Gram stain was negative for organisms but positive for 1+ WBC and 4+ RBC, pleural fluid cultures are pending    Imaging results reviewed over the past 24 hrs:   Recent Results (from the past 24 hour(s))   CT Chest w contrast*    Narrative    EXAM: CT CHEST W CONTRAST  LOCATION: [facility]  DATE: 9/29/2023    INDICATION: loculated parapneumonic effusion  COMPARISON: 9/22/2023  TECHNIQUE: CT chest with IV contrast. Multiplanar reformats were obtained. Dose reduction techniques were used.    CONTRAST: 75mL Isovue 370    FINDINGS:   LUNGS AND PLEURA: Increased interstitial lung markings. Mild diffuse bronchiectasis in the lung bases. Underlying centrilobular emphysema. Left-sided bronchial wall thickening with atelectasis, increased from prior. There is wedge-shaped consolidation in   the left upper lobe measuring 2.1 x 2.1 cm series 4, image 112. This was present previously. Increasing likely moderate loculated left pleural fluid, including  trapped in the fissure. No pneumothorax.    MEDIASTINUM/AXILLAE: Right port. Median sternotomy. Visualized thyroid gland is unremarkable. Small, possibly reactive mediastinal lymph nodes. No cardiomegaly or pericardial effusion. Normal caliber thoracic aorta. Aortic atherosclerosis. There is   narrowing at the origin of the left common carotid artery. No acute abnormality. Although not a dedicated PE study, no gross PE. There is mild enlargement of the main pulmonary outflow tract, which may represent pulmonary arterial hypertension.    CORONARY ARTERY CALCIFICATION: Previous intervention (stents or CABG).    UPPER ABDOMEN: Cholecystectomy. There is a 2.6 cm left and a 2.5 cm right adrenal nodule. These measure about 74 Hounsfield units on postcontrast imaging. They are similar to prior.    MUSCULOSKELETAL: Unremarkable.      Impression    IMPRESSION:     Overall, mild worsening compared to 9/22/2023:    1.  Increasing volume of a moderate loculated left pleural effusion. Consider empyema in the differential.    2.  Evolving peripheral consolidation in the left upper lobe with increasing left peribronchial infiltrates/atelectasis, consistent with acute pneumonia.    3.  Bilateral adrenal nodules, unchanged compared to 2016, very likely benign, such as adenomas, not requiring additional follow-up imaging.   US Thoracentesis    Narrative    ULTRASOUND THORACENTESIS September 29, 2023 10:40 AM    CLINICAL HISTORY: Pneumonia with left parapneumonic effusion,  immunocompromised.    PROCEDURE: Same day chest CT reviewed, which demonstrates a small  loculated left pleural effusion. Informed consent obtained. Time out  performed. The chest was prepped and draped in sterile fashion. 10 mL  of 1% lidocaine was infused into the local soft tissues. Under direct  ultrasound guidance, a 5 Qatari catheter system was placed into the  pleural effusion.     15 mL of serosanguineous fluid were removed and sent for  laboratory  analysis.    Patient tolerated procedure well.    Ultrasound imaging was obtained and placed in the patient's permanent  medical record.      Impression    IMPRESSION: Status post left ultrasound-guided thoracentesis. Only  minimal serosanguineous fluid was removed. This is likely due to the  small and loculated nature of the effusion.    Reference CPT Code: 09981    BESSY HERNANDEZ MD         SYSTEM ID:  [id number]     Recent Labs   Lab 09/29/23  1102 09/29/23  0900 09/29/23  0555 09/28/23  0524 09/27/23  0528 09/24/23  0604 09/23/23 2243   WBC  --   --  26.3* 24.0* 24.2*   < > 34.0*   HGB  --   --  11.8* 11.8* 11.4*   < > 12.3*   MCV  --   --  87 87 87   < > 88   PLT  --   --  184 149* 127*   < > 106*   INR  --  1.19*  --   --   --   --   --    NA  --   --  139 135 137   < > 138   POTASSIUM  --   --  3.8 3.6 3.8   < > 4.2   CHLORIDE  --   --  101 101 103   < > 103   CO2  --   --  24 25 24   < > 21*   BUN  --   --  10.8 8.9 8.7   < > 17.9   CR  --   --  0.90 0.91 0.93   < > 1.10   ANIONGAP  --   --  14 9 10   < > 14   NIDA  --   --  8.8 8.8 8.6*   < > 9.1   GLC  --   --  149* 136* 107*   < > 117*   ALBUMIN  --   --   --   --   --   --  4.1   PROTTOTAL 6.5  --   --   --   --   --  6.9   BILITOTAL  --   --   --   --   --   --  0.6   ALKPHOS  --   --   --   --   --   --  94   ALT  --   --   --   --   --   --  20   AST  --   --   --   --   --   --  24    < > = values in this interval not displayed.

## 2023-09-29 NOTE — PLAN OF CARE
Goal Outcome Evaluation:      Plan of Care Reviewed With: patient    Overall Patient Progress: decliningOverall Patient Progress: declining    Outcome Evaluation: CT chest result showed: Overall, mild worsening compared to 9/22/2023. Left Cavity Thoracentesis done. Sample sent to lab, some sample needs to be recollected as per [facility] lab, provider Dr. Polanco informed. Pt ambulated to hallway 60 feet. Does need to be bumped up to 4L O2 via NC while activity. Weaned to 1L while at rest. Pt flagged for sepsis, lactic acid : 1.0

## 2023-09-29 NOTE — PLAN OF CARE
Goal Outcome Evaluation:      Plan of Care Reviewed With: patient    Overall Patient Progress: improvingOverall Patient Progress: improving    Outcome Evaluation: Fine crackles in bases of lungs and continues on 1.5 liters with oxygen needs increasing with activity. Flagged for lactic which came back at 1.1. Has denied pain. Sats were 85% on room air at rest, 90% on 1.5 liters at rest, 79% on room air with activity, and 81% on 3 liters with walking. Has denied pain. Refused to get up in chair to eat supper.  Bed alarm on.

## 2023-09-29 NOTE — PLAN OF CARE
Goal Outcome Evaluation:      Plan of Care Reviewed With: patient    Overall Patient Progress: improvingOverall Patient Progress: improving    Outcome Evaluation: Crackles to lung bases. O2 via NC while Pt awake, switched to oxy mask while sleeping. Port NS at TKO. Pt denies pain. Up in chair. Ambulating SBA.

## 2023-09-30 NOTE — PLAN OF CARE
Goal Outcome Evaluation:      Plan of Care Reviewed With: patient    Overall Patient Progress: decliningOverall Patient Progress: declining    Outcome Evaluation:  IV Unasyn restarted as per order. Pt has poor oral intake, irritable and had a spike of fever at 99.9F, PRN Tylenol given. Rechecked after an 2 hours and went 98.7F. Pt was on 3L O2 NC while awake and 3L Oxymask while sleeping. Voiding frequency with little UO. Bladder scanned with 60ml residue noted. Informed VASHTI Bell ordered and sent (se results).SBA to chair.

## 2023-09-30 NOTE — PROGRESS NOTES
[facility]    Medicine Progress Note - Hospitalist Service    Date of Admission:  9/23/2023    Assessment & Plan   Patient is an 83 year old man with active metastatic non-small cell lung ca being treated with immunomodulatory therapy, CMML with chronic thrombocytopenia, moderate COPD with chronic hypoxic respiratory failure, CAD with remote history of CABG, and dementia who presented to the ED with increasing shortness of breath and cough and was admitted due to concerns for acute on chronic respiratory failure and left upper lobe pneumonia     Community-acquired pneumonia  Left pleural effusion  Acute on chronic hypoxic respiratory failure  Probable immunocompromise  SIRS, possible sepsis  Presented with symptoms and signs typical for pneumonia and new radiographic infiltrate in the left upper lobe as well as small loculated left pleural effusion.  Also presented with worsening leukocytosis compared with baseline, tachypnea, and tachycardia concerning for SIRS.  In context of suspected infection, SIRS would be worrisome for sepsis.  He had worsening hypoxia compared with baseline necessitating more oxygen supplementation than normal.  His chronic respiratory failure is usually treated with 2 L nasal cannula during exertion but not at rest.  Procalcitonin was minimally elevated at 0.09, but CRP was elevated at 115.  He had worsening thrombocytopenia compared with baseline with initial platelets 106 on admission.  Specific causative organism has not been identified after negative testing for COVID, influenza, and RSV, negative MRSA testing, and negative blood cultures so far.  CT demonstrated small loculated left pleural effusion that had also been present in July 2023.  Initial test results of pleural fluid after diagnostic thoracentesis 9/29 are suspicious for exudate although not necessarily empyema.  Malignant pleural effusion remains a consideration and the effusion was grossly  bloody.  He is probably immunocompromised while receiving immunomodulatory therapy (last administered August 30) and could be at risk for unusual infection such as fungal infection.  He is not known to have had TB previously.  He worsened with new onset fever, worsening leukocytosis, and rising CRP within 24 hours of diagnostic thoracentesis.  -Transition from Augmentin back to empiric IV Unasyn  -Ordered recheck WBC and CRP   -Ordered testing of pleural fluid cholesterol if able to add onto sample obtained yesterday  -Order UA due to new fever and new onset urinary frequency and urgency  -titrate and wean oxygen supplementation to keep SaO2 88 to 94%  -Continue pulmonary hygiene  -Recommended outpatient follow-up with his oncologist to review results of cytology testing of pleural fluid once they are available if he otherwise improves  -Reconsider hospital transfer for higher level of care including inpatient thoracic surgery consultation (not available at this hospital) to consider invasive intervention for treatment of exudative pleural effusion if he fails to improve    Coagulopathy  INR prolonged at 1.19 concerning for coagulopathy.  He is at increased risk for bleeding, but bleeding has not been clinically evident (aside from grossly bloody pleural effusion).  Coagulopathy could be due to inadequate nutritional intake but could also be due to acute organ dysfunction and sepsis.  Use of Lovenox during hospitalization could conceivably affect INR.  -Monitor INR as indicated    Metastatic non-small call lung cancer on immunotherapy  Diagnosed with stage IIb disease in January 2023 with left upper lobe primary tumor and intrathoracic lymph node metastasis.  He follows with [facility] and after initial chemotherapy and radiation he has been maintained on durvalumab immunotherapy treatment monthly (last given August 30).  Next immunotherapy was due 9/27/23 but was not administered due to current hospitalization and  concern for infection.  He was hospitalized in July for pneumonitis attributed to immunomodulatory therapy and was treated with corticosteroids but not antibiotics.  During that hospitalization, he was noted to have left pleural effusion.  -Recommended close outpatient follow up with [facility] provider following discharge     CML  CML was diagnosed in 2018 after bone marrow biopsy and patient has had chronic mild leukocytosis and thrombocytopenia since then and is following with [facility].  Per medical records, WBC ranged 8-14 earlier this year but 16-24 in 7/23 when he was being treated with corticosteroids.  Admission WBC was 34, significantly higher than previously raising concern for acute infection.  Recent baseline platelet count is not known.  Platelet was 106 at admission and has steadily improved since admission and today is normal.  -Ordered monitoring of WBC and platelets   -outpatient follow up with oncology following discharge     COPD without exacerbation   Patient with known COPD with chronic exertional hypoxia but no hypercapnia.  His chronic exertional hypoxia is managed with exertional use of oxygen supplementation at his normal baseline.  Patient has not had acute wheezing or signs of acute COPD flare and is not being treated with steroids at this time.    -Continue chronic bronchodilator regimen but substituted Breo Ellipta and Incruse ellipta for home Trelegy Ellipta due to hospital formulary .    -Titrate and wean oxygen supplementation to achieve goal sats of 88-94%    CAD  Previous CABG  Hypertension  Diastolic dysfunction  Patient had known history of CAD and CABG many years ago.  He has hypertension chronically treated with beta-blocker and ACE inhibitor.  Initial chest x-ray demonstrated venous congestion, but BNP was normal and patient has no known history of CHF.  Echo during this hospitalization demonstrated grade I diastolic dysfunction but normal EF.  Acute CHF has not been  "suspected clinically.  -Continue chronic doses of BB and ACE  -Daily weights  -I/O's  -TKO port and avoid excess fluids  -monitor for any signs of angina     Dementia  Chronic and clinically stable.  -Continue chronic doses of Aricept and Namenda  -Anticipate return to home living situation once medically stable    Obesity  Chronic with BMI 33  -No acute intervention       Diet: Regular Diet Adult    DVT Prophylaxis: Enoxaparin (Lovenox) SQ  Andrews Catheter: Not present  Lines: PRESENT      Port a Cath 09/23/23 Right Chest wall-Site Assessment: WDL      Cardiac Monitoring: None  Code Status: Full Code      Clinically Significant Risk Factors               # Coagulation Defect: INR = 1.19 (Ref range: 0.85 - 1.15) and/or PTT = N/A, will monitor for bleeding    # Hypertension: Noted on problem list     # Dementia: noted on problem list    # Obesity: Estimated body mass index is 33.28 kg/m  as calculated from the following:    Height as of this encounter: 1.676 m (5' 6\").    Weight as of this encounter: 93.5 kg (206 lb 3.2 oz).             Disposition Plan    unknown       Francisco Polanco MD  Hospitalist Service  [facility]  Securely message with [employer] (more info)  Text page via [employer] Paging/Directory   ______________________________________________________________________    Interval History   Overnight patient developed new fever to 100.6 that improved after a dose of Tylenol, but temperature is again starting to rise this afternoon.  He remains hemodynamically stable with mild tachycardia.  Oxygenation has been generally stable although worsened overnight compared with late yesterday when he had weaned to 1 L nasal cannula supplementation.  He is not eating or drinking as much today.  He has frequent urge to void but has been voiding only small amounts and did not have significant postvoid residual with bladder scan demonstrating 60 mL after voiding.  Patient himself offers no new " complaints.    Physical Exam   Vital Signs: Temp: 100.2  F (37.9  C) Temp src: Oral BP: 120/58 Pulse: 104   Resp: 20 SpO2: 92 % O2 Device: Nasal cannula Oxygen Delivery: 3 LPM  Patient Vitals for the past 24 hrs:   BP Temp Temp src Pulse Resp SpO2 Weight   09/30/23 1356 -- 100.2  F (37.9  C) -- -- -- -- --   09/30/23 1316 -- 99.9  F (37.7  C) -- -- -- 92 % --   09/30/23 0808 120/58 98.5  F (36.9  C) Oral 104 20 93 % --   09/30/23 0600 -- 98.9  F (37.2  C) Oral -- -- -- --   09/30/23 0445 138/60 100  F (37.8  C) Oral 105 18 90 % --   09/30/23 0324 129/62 98.1  F (36.7  C) Oral 105 16 -- 93.5 kg (206 lb 3.2 oz)   09/30/23 0100 -- -- -- -- -- 92 % --   09/30/23 0045 -- 98.8  F (37.1  C) Oral -- -- -- --   09/29/23 2357 -- -- -- -- -- 91 % --   09/29/23 2325 -- (!) 100.6  F (38.1  C) -- -- -- -- --   09/29/23 2322 -- (!) 100.6  F (38.1  C) Oral -- -- -- --   09/29/23 2317 -- -- -- -- -- 92 % --   09/29/23 2300 -- -- -- -- -- 91 % --   09/29/23 2251 -- -- -- -- -- (!) 89 % --   09/29/23 2248 120/68 99.8  F (37.7  C) Oral 101 18 (!) 87 % --     Weight: 206 lbs 3.2 oz  Vitals:    09/26/23 0647 09/27/23 1116 09/28/23 0249 09/29/23 0130   Weight: 95.2 kg (209 lb 12.8 oz) 94.8 kg (209 lb 1.6 oz) 94.2 kg (207 lb 9.6 oz) 93.8 kg (206 lb 11.2 oz)    09/30/23 0324   Weight: 93.5 kg (206 lb 3.2 oz)       Intake/Output Summary (Last 24 hours) at 9/30/2023 1427  Last data filed at 9/30/2023 1411  Gross per 24 hour   Intake 480 ml   Output 810 ml   Net -330 ml       General Appearance: Ill-appearing mildly diaphoretic elderly man though no other signs of acute distress while resting in bed  Respiratory: Normal respiratory effort, diminished breath sounds at the lung bases, clear lung fields anteriorly and laterally  Cardiovascular: Tachycardic with regular rhythm, good radial pulse, brisk capillary refill, no significant peripheral edema  Neuro: Appears alert, responds to questions, obvious memory problems    Medical Decision Making              Data     I have personally reviewed the following data over the past 24 hrs:    37.2 (H)  \   N/A   / 202     136 101 9.7 /  104 (H)   4.1 24 0.93 \     ALT: N/A AST: N/A AP: N/A TBILI: N/A   ALB: N/A TOT PROTEIN: N/A LIPASE: N/A     Procal: N/A CRP: 71.69 (H) Lactic Acid: 1.1         For comparison, CRP yesterday was 61  Pleural fluid total protein 5.2 with serum total protein 6.5  Pleural fluid glucose 40 (serum glucose 149)  Pleural fluid culture negative so far at less than 1 day incubation    Recent Labs   Lab 09/30/23  0556 09/29/23  1102 09/29/23  0900 09/29/23  0555 09/28/23  0524 09/27/23  0528 09/24/23  0604 09/23/23  2243   WBC 37.2*  --   --  26.3* 24.0* 24.2*   < > 34.0*   HGB  --   --   --  11.8* 11.8* 11.4*   < > 12.3*   MCV  --   --   --  87 87 87   < > 88     --   --  184 149* 127*   < > 106*   INR  --   --  1.19*  --   --   --   --   --      --   --  139 135 137   < > 138   POTASSIUM 4.1  --   --  3.8 3.6 3.8   < > 4.2   CHLORIDE 101  --   --  101 101 103   < > 103   CO2 24  --   --  24 25 24   < > 21*   BUN 9.7  --   --  10.8 8.9 8.7   < > 17.9   CR 0.93  --   --  0.90 0.91 0.93   < > 1.10   ANIONGAP 11  --   --  14 9 10   < > 14   NIDA 9.0  --   --  8.8 8.8 8.6*   < > 9.1   *  --   --  149* 136* 107*   < > 117*   ALBUMIN  --   --   --   --   --   --   --  4.1   PROTTOTAL  --  6.5  --   --   --   --   --  6.9   BILITOTAL  --   --   --   --   --   --   --  0.6   ALKPHOS  --   --   --   --   --   --   --  94   ALT  --   --   --   --   --   --   --  20   AST  --   --   --   --   --   --   --  24    < > = values in this interval not displayed.

## 2023-09-30 NOTE — PLAN OF CARE
Goal Outcome Evaluation:      Plan of Care Reviewed With: patient    Overall Patient Progress: no changeOverall Patient Progress: no change     Vitals stable, no C/O pain. IV fluids running at 10 ml/hr. Lungs sounds diminished. O2 increased to 3 L to keep sats above 88%. Pt had temp of 100.6, PRN tylenol 650 mg given. Temp decreased to 98.8. Pt flagged lactic at 0340 1.1. Will continue to monitor.

## 2023-09-30 NOTE — PROGRESS NOTES
"Pt is having frequent urge to void but not much output. Bladder scanned with 60ml residue note. No burning sensation. Pt not taking that much orally. Paged Dr. Polanco. Dr. Polanco replied \"noted. will assess when I see him today\"    "

## 2023-10-01 PROBLEM — J18.9 LEFT UPPER LOBE PNEUMONIA: Status: ACTIVE | Noted: 2023-01-01

## 2023-10-01 PROBLEM — E87.1 HYPONATREMIA: Status: ACTIVE | Noted: 2023-01-01

## 2023-10-01 NOTE — PROGRESS NOTES
[facility]    Medicine Progress Note - Hospitalist Service    Date of Admission:  9/23/2023    Assessment & Plan   Patient is an 83 year old man with active metastatic non-small cell lung ca being treated with immunomodulatory therapy, CMML with chronic thrombocytopenia, moderate COPD with chronic hypoxic respiratory failure, CAD with remote history of CABG, and dementia who presented to the ED with increasing shortness of breath and cough and was admitted due to concerns for acute on chronic respiratory failure and left upper lobe pneumonia.     Community-acquired pneumonia  Left pleural effusion  Acute on chronic hypoxic respiratory failure  Probable immunocompromise  SIRS, possible sepsis  Presented with symptoms and signs typical for pneumonia and new radiographic infiltrate in the left upper lobe as well as small loculated left pleural effusion.  Also presented with worsening leukocytosis compared with baseline, tachypnea, and tachycardia concerning for SIRS.  In context of suspected infection, SIRS would be worrisome for sepsis.  He had worsening hypoxia compared with baseline necessitating more oxygen supplementation than normal.  His chronic respiratory failure is usually treated with 2 L nasal cannula during exertion but not at rest.  Procalcitonin was minimally elevated at 0.09, but CRP was elevated at 115.  He had worsening thrombocytopenia compared with baseline with initial platelets 106 on admission.  Specific causative organism has not been identified after negative testing for COVID, influenza, and RSV, negative MRSA testing, negative blood cultures, and negative pleural fluid cultures so far.  Initial CT demonstrated small loculated left pleural effusion that had increased in size compared with July 2023.  At diagnostic thoracentesis of left pleural effusion on 9/29, pleural fluid was grossly bloody with initial test results (elevated pleural fluid total protein and  cholesterol, pleural fluid LDH could not be reliably assessed) suspicious for exudate.  Malignant pleural effusion remains a consideration (previous lung cancer was in the left upper lobe).  He is probably immunocompromised while receiving immunomodulatory therapy (last administered August 30) and could be at risk for unusual infection.  He is not known to have had TB previously.  Since thoracentesis on 9/29, he worsened clinically with new onset fever, worsening leukocytosis, and rising CRP within 24 hours of diagnostic thoracentesis raising concern for empyema.  No other cause for fever and worsening SIRS has been identified.  -Transition empiric IV Unasyn to IV meropenem to broaden antibiotic coverage because of worsening status  -Ordered recheck WBC and CRP   -Ordered chest CT for reevaluation of pleural effusion  -titrate and wean oxygen supplementation to keep SaO2 88 to 94%  -Continue pulmonary hygiene  -Recommended outpatient follow-up with his oncologist to review results of cytology testing of pleural fluid once they are available if he otherwise improves  -Discussed hospital transfer for higher level of care including inpatient thoracic surgery consultation (not available at this hospital) to consider invasive intervention for treatment of possible left empyema, patient and his spouse are agreeable with hospital transfer, contacted [facility] about transfer [facility] where he normally sees his specialists including his oncologist and thoracic surgeon    Coagulopathy  INR prolonged at 1.19 concerning for coagulopathy.  He is at increased risk for bleeding, but bleeding has not been clinically evident (aside from grossly bloody pleural effusion).  Coagulopathy could be due to inadequate nutritional intake but could also be due to acute organ dysfunction and sepsis.    -Monitor INR as indicated    Hyponatremia  He has developed new mild hyponatremia on 10/1, possibly related to ongoing  inflammatory lung issues.  He may be at risk for SIADH.  He is not overtly symptomatic from hyponatremia at this time.  -Ordered recheck of sodium and would reconsider additional diagnostic evaluation if hyponatremia worsens    Metastatic non-small call lung cancer on immunotherapy  Diagnosed with stage IIb disease in January 2023 with left upper lobe primary tumor and intrathoracic lymph node metastasis.  He follows with [facility] and after initial chemotherapy and radiation he has been maintained on durvalumab immunotherapy treatment monthly (last given August 30).  Next immunotherapy was due 9/27/23 but was not administered due to current hospitalization and concern for infection.  He was hospitalized in July for pneumonitis attributed to immunomodulatory therapy and was treated with corticosteroids but not antibiotics.  During that hospitalization, he was noted to have left pleural effusion.  -Recommended close outpatient follow up with [facility] provider following discharge     CML  CML was diagnosed in 2018 after bone marrow biopsy and patient has had chronic mild leukocytosis and thrombocytopenia since then and is following with [facility].  Per medical records, WBC ranged 8-14 earlier this year but 16-24 in 7/23 when he was being treated with corticosteroids.  Admission WBC was 34, significantly higher than previously raising concern for acute infection.  Recent baseline platelet count is not known.  Platelet was 106 at admission and has steadily improved since admission and today is normal.  -Ordered monitoring of WBC and platelets   -outpatient follow up with oncology following discharge     COPD without exacerbation   Patient with known COPD with chronic exertional hypoxia but no hypercapnia.  His chronic exertional hypoxia is managed with exertional use of oxygen supplementation at his normal baseline.  Patient has not had acute wheezing or signs of acute COPD flare and is not being treated with  "steroids at this time.    -Continue chronic bronchodilator regimen but substituted Breo Ellipta and Incruse ellipta for home Trelegy Ellipta due to hospital formulary .    -Titrate and wean oxygen supplementation to achieve goal sats of 88-94%    CAD  Previous CABG  Hypertension  Diastolic dysfunction  Patient had known history of CAD and CABG many years ago.  He has hypertension chronically treated with beta-blocker and ACE inhibitor.  Initial chest x-ray demonstrated venous congestion, but BNP was normal and patient has no known history of CHF.  Echo during this hospitalization demonstrated grade I diastolic dysfunction but normal EF.  Acute CHF has not been suspected clinically.  -Continue chronic doses of BB and ACE  -Daily weights  -I/O's  -TKO port and avoid excess fluids  -monitor for any signs of angina     Dementia  Chronic and clinically stable.  -Continue chronic doses of Aricept and Namenda  -Anticipate return to home living situation once medically stable    Obesity  Chronic with BMI 33  -No acute intervention    Port-A-Cath in place  He has chronic indwelling Port-A-Cath being used for IV access during this hospitalization.  -Management Port-A-Cath per standard protocol       Diet: Regular Diet Adult    DVT Prophylaxis: Enoxaparin (Lovenox) SQ  Andrews Catheter: Not present  Lines: PRESENT      Port a Cath 09/23/23 Right Chest wall-Site Assessment: WDL      Cardiac Monitoring: None  Code Status: Full Code      Clinically Significant Risk Factors               # Coagulation Defect: INR = 1.19 (Ref range: 0.85 - 1.15) and/or PTT = N/A, will monitor for bleeding    # Hypertension: Noted on problem list     # Dementia: noted on problem list    # Obesity: Estimated body mass index is 33.57 kg/m  as calculated from the following:    Height as of this encounter: 1.676 m (5' 6\").    Weight as of this encounter: 94.3 kg (208 lb).             Disposition Plan         Anticipate hospital transfer to higher level of " care once appropriate bed is available    Francisco Polanco MD  Hospitalist Service  [facility]  Securely message with [employer] (more info)  Text page via [facility] Paging/Directory   ______________________________________________________________________    Interval History   There were no significant overnight events clinically.  Patient says he thinks he feels better today.  He has not had fever for over 24 hours with maximum temperature only 100.2 yesterday.  He remains hemodynamically stable although had heart rate as high as 130 last evening temporarily.  Oxygenation has been stable with stable oxygen requirement.  He has been voiding spontaneously with adequate urine output.  He has not had a bowel movement since early morning September 29.  Patient himself offers no new complaints today.    Physical Exam   Vital Signs: Temp: 98.7  F (37.1  C) Temp src: Oral BP: (!) 140/70 Pulse: 109   Resp: 22 SpO2: 92 % O2 Device: Nasal cannula Oxygen Delivery: 2.5 LPM  Patient Vitals for the past 24 hrs:   BP Temp Temp src Pulse Resp SpO2 Weight   10/01/23 0747 (!) 140/70 98.7  F (37.1  C) Oral -- 22 92 % --   10/01/23 0433 -- -- -- -- -- -- 94.3 kg (208 lb)   10/01/23 0400 -- -- -- -- -- 90 % --   10/01/23 0200 -- -- -- -- -- 93 % --   10/01/23 0000 -- -- -- -- -- 92 % --   09/30/23 2221 132/62 98.8  F (37.1  C) Oral 109 22 94 % --   09/30/23 2200 124/66 -- -- 109 -- 95 % --   09/30/23 2157 124/66 100  F (37.8  C) Oral 109 22 96 % --   09/30/23 2049 112/58 98.2  F (36.8  C) Oral (!) 127 20 91 % --   09/30/23 2014 111/87 98.6  F (37  C) Oral (!) 130 -- 91 % --   09/30/23 1908 -- 99.8  F (37.7  C) Oral -- -- -- --   09/30/23 1631 110/62 -- -- 106 20 93 % --   09/30/23 1612 -- -- -- -- -- 93 % --   09/30/23 1513 -- 98.7  F (37.1  C) Oral -- 22 -- --   09/30/23 1356 -- 100.2  F (37.9  C) -- -- -- -- --   09/30/23 1316 -- 99.9  F (37.7  C) -- -- -- 92 % --     Weight: 208 lbs 0 oz  Vitals:    09/27/23  1116 09/28/23 0249 09/29/23 0130 09/30/23 0324   Weight: 94.8 kg (209 lb 1.6 oz) 94.2 kg (207 lb 9.6 oz) 93.8 kg (206 lb 11.2 oz) 93.5 kg (206 lb 3.2 oz)    10/01/23 0433   Weight: 94.3 kg (208 lb)       Intake/Output Summary (Last 24 hours) at 10/1/2023 1201  Last data filed at 10/1/2023 0824  Gross per 24 hour   Intake 2241 ml   Output 930 ml   Net 1311 ml       General Appearance: Ill-appearing elderly man without signs of acute distress sitting up in a chair  Respiratory: Normal respiratory effort, inspiratory crackles at the right lung base, diminished breath sounds at the left lung base, no wheezing  Cardiovascular: Mildly tachycardic with regular rhythm, good radial pulse, brisk capillary refill, no peripheral edema  Neuro: Alert and appears to maintain wakefulness, obvious memory problems    Medical Decision Making       40 MINUTES SPENT BY ME on the date of service doing chart review, history, exam, documentation & further activities per the note.      Data     I have personally reviewed the following data over the past 24 hrs:    43.5 (H)  \   N/A   / N/A     133 (L) 98 12.7 /  129 (H)   3.7 23 1.05 \     Procal: 0.37 (H) CRP: 244.68 (H) Lactic Acid: 1.1         Magnesium 2.0  For comparison, CRP yesterday was 72 and previous procalcitonin on September 22 was 0.09  Lactic acid last evening at 8:30 PM was 2.2  Pleural fluid aerobic and anaerobic cultures are negative so far  Pleural fluid cholesterol was 68    Venous Blood Gas  Recent Labs   Lab 10/01/23  0517 09/26/23  0548   PHV 7.41 7.37   PCO2V 42 46   PO2V 35 34   HCO3V 27 27   ISABEL 2.1* 1.0   O2PER 32 26     Imaging results reviewed over the past 24 hrs:   Recent Results (from the past 24 hour(s))   CT Chest w Contrast    Narrative    EXAM: CT CHEST W CONTRAST  LOCATION: [facility]  DATE: 10/1/2023    INDICATION: GREGG pneumonia, left pleural effusion, lung cancer on immunomodulatory treatment, CML, worsening fever  and   leukocytosis  COMPARISON: 9/29/2023.  TECHNIQUE: CT chest with IV contrast. Multiplanar reformats were obtained. Dose reduction techniques were used.    CONTRAST: 80mL, Isovue 370    FINDINGS:   LUNGS AND PLEURA: Emphysema. Again seen is the loculated left pleural effusion with associated atelectasis and consolidation. This is slightly increased in the left lung base when compared to prior exam. Additionally, in the inferior aspect of the   effusion the contents are more hyperdense on series 3 image 43. Similar 2.1 cm nodular opacity in the left upper lobe on series 4 image 113. No pneumothorax or suspicious pulmonary nodules. Mild interlobular septal thickening.    MEDIASTINUM/AXILLAE: Heart size is normal. CABG changes and sternotomy wires.  Dilated main pulmonary artery measures up to 4.4 cm suggestive of pulmonary hypertension.  Similar borderline enlarged likely reactive lymph nodes.     CORONARY ARTERY CALCIFICATION: Previous intervention (stents or CABG).    UPPER ABDOMEN: Hepatic steatosis.  Cholecystectomy.  Bilateral adrenal nodules are unchanged.     MUSCULOSKELETAL: Degenerative changes of the spine.       Impression    IMPRESSION:   1.  Slightly increased loculated left pleural effusion. The inferior aspect of the effusion is slightly more hyperintense compared to prior exam suggestive of complexity.  2.  The remainder the exam is otherwise not significantly changed.     Recent Labs   Lab 10/01/23  0517 09/30/23  0556 09/29/23  1102 09/29/23  0900 09/29/23  0555 09/28/23  0524 09/27/23  0528   WBC 43.5* 37.2*  --   --  26.3* 24.0* 24.2*   HGB  --   --   --   --  11.8* 11.8* 11.4*   MCV  --   --   --   --  87 87 87   PLT  --  202  --   --  184 149* 127*   INR  --   --   --  1.19*  --   --   --    * 136  --   --  139 135 137   POTASSIUM 3.7 4.1  --   --  3.8 3.6 3.8   CHLORIDE 98 101  --   --  101 101 103   CO2 23 24  --   --  24 25 24   BUN 12.7 9.7  --   --  10.8 8.9 8.7   CR 1.05 0.93  --   --   0.90 0.91 0.93   ANIONGAP 12 11  --   --  14 9 10   NIDA 8.6* 9.0  --   --  8.8 8.8 8.6*   * 104*  --   --  149* 136* 107*   PROTTOTAL  --   --  6.5  --   --   --   --

## 2023-10-01 NOTE — PROGRESS NOTES
S-(situation):  Waiting for Transfer to [facility] Via [facility]    B-(background): Admitted d/t Community-acquired pneumonia  Left pleural effusion  Acute on chronic hypoxic respiratory failure  Probable immunocompromise  SIRS, possible sepsis. Hx of Metastatic non-small call lung cancer on immunotherapy     A-(assessment): Pt's CRP and WBC trending up 244 and 43.5 respectviely. IV antibiotic shifted to Meropenem IV by provider. CT of chest done (see results). No fever this shift. VSS. On O2 via NC at 2.5L/hr. Pt's mood though, is less irritable unlike yesterday. SBA to chair.     R-(recommendations): Transfer to higher level of care including inpatient thoracic surgery consultation (not available at this hospital) to consider invasive intervention for treatment of possible left empyema per physician orders. Report called to VIN Patel. Family member notified or present JENNIFER . Continue to monitor pt and update physician as needed.      Code status: Full Code  Skin: scattered bruise present  Fall Risk: Yes- Department fall risk interventions implemented.  Isolation: Droplet  Medication drips upon transfer: NS @ 10ml/hr  Influenza status verified at discharge: No

## 2023-10-01 NOTE — DISCHARGE SUMMARY
[facility] Acute Transfer/Discharge Summary    Fernando Bermudez MRN# [id number]   Age: 83 year old YOB: 1940     Date of Admission:  9/23/2023  Date of Transfer:  10/1/2023   Admitting Physician:  Francisco Polanco MD  Transferring Physician:  Francisco Polanco MD           Admission Diagnoses:   Pneumonia [J18.9]  Community acquired bacterial pneumonia [J15.9]  COPD exacerbation (H) [J44.1]  Leukocytosis, unspecified type [D72.829]          Transfer Diagnoses:   Principal diagnosis:   Left upper lobe pneumonia    Secondary diagnoses:    Left upper lobe pneumonia    Acute on chronic respiratory failure with hypoxia (H)    Pleural effusion on left    Immunocompromised (H24)    SIRS (systemic inflammatory response syndrome) (H)    Senile dementia (H)    COPD exacerbation (H)    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 *            Procedures:   Invasive procedures: Diagnostic left thoracentesis on September 29     Critical care procedures performed:    None             Transfer Medications:       Current Discharge Medication List        START taking these medications    Details   enoxaparin ANTICOAGULANT (LOVENOX) 40 MG/0.4ML syringe Inject 0.4 mLs (40 mg) Subcutaneous every 24 hours    Associated Diagnoses: Community acquired bacterial pneumonia      meropenem (MERREM) 1 g vial Inject 1,000 mg (1 g) into the vein every 8 hours    Associated Diagnoses: Pneumonia due to infectious organism, unspecified laterality, unspecified part of lung; Pleural effusion on left; Immunocompromised (H24); SIRS (systemic inflammatory response syndrome)  (H); Acute on chronic respiratory failure with hypoxia (H)      sodium chloride 0.9% infusion Inject 10 mL/hr into the vein continuous    Associated Diagnoses: Pneumonia due to infectious organism, unspecified laterality, unspecified part of lung; Pleural effusion on left; Immunocompromised (H24); SIRS (systemic inflammatory response syndrome) (H)           CONTINUE these medications which have NOT CHANGED    Details   aspirin 81 MG tablet Take 1 tablet by mouth daily.  Refills: 3    Associated Diagnoses: Diabetes mellitus, type 2 (H)      atorvastatin (LIPITOR) 80 MG tablet Take 1 tablet (80 mg) by mouth daily  Qty: 90 tablet, Refills: 3    Associated Diagnoses: Hyperlipidemia LDL goal < 70      !! Blood Glucose Monitoring Suppl (ONE TOUCH BASIC SYSTEM) W/DEVICE KIT Use to test sugars up to once daily.  Dispense with lancets, test strips to last 3 months.  Qty: 1 kit, Refills: 0    Associated Diagnoses: Impaired fasting glucose      !! Blood Glucose Monitoring Suppl W/DEVICE KIT 1 kit daily  Qty: 1 kit, Refills: 0    Comments: Pharmacy: please dispense glucometer, control solution, test strips, and lancets per patient's insurance.  Associated Diagnoses: Impaired fasting glucose      cholestyramine light (QUESTRAN) 4 GM packet TAKE ONE  PACKET (4G)BY MOUTH TWICE DAILY AS NEEDED  Qty: 180 packet, Refills: 1    Associated Diagnoses: Fecal urgency      coenzyme Q-10 200 MG CAPS Take 1 capsule by mouth daily    Associated Diagnoses: Hyperlipidemia LDL goal < 70      donepezil (ARICEPT) 10 MG tablet Take 1 tablet (10 mg) by mouth At Bedtime  Qty: 90 tablet, Refills: 3    Associated Diagnoses: Senile dementia (H)      Fluticasone-Umeclidin-Vilant (TRELEGY ELLIPTA) 100-62.5-25 MCG/ACT oral inhaler Inhale 1 puff into the lungs daily      glucose blood VI test strips strip by In Vitro route daily. For daily glucose testing.  Pt requests OneTouch Ultra Mini strips.  Qty: 100 each, Refills: 3    Associated Diagnoses: Impaired  fasting glucose      latanoprost (XALATAN) 0.005 % ophthalmic solution Place 1 drop into both eyes daily (with dinner)      memantine (NAMENDA) 5 MG tablet Take 5 mg by mouth 2 times daily      metoprolol succinate ER (TOPROL XL) 50 MG 24 hr tablet Take 1 tablet by mouth daily      MULTI-VITAMIN OR 1 TABLET DAILY      timolol maleate (TIMOPTIC) 0.25 % ophthalmic solution Place 1 drop into both eyes every morning       !! - Potential duplicate medications found. Please discuss with provider.        STOP taking these medications       amoxicillin-clavulanate (AUGMENTIN) 875-125 MG tablet Comments:   Reason for Stopping:         azithromycin (ZITHROMAX) 250 MG tablet Comments:   Reason for Stopping:         Diphenhydramine-Phenylephrine (DELSYM NIGHT TIME COUGH/COLD) 6.25-2.5 MG/5ML LIQD Comments:   Reason for Stopping:         guaiFENesin (MUCINEX) 600 MG 12 hr tablet Comments:   Reason for Stopping:         lisinopril (PRINIVIL,ZESTRIL) 5 MG tablet Comments:   Reason for Stopping:         Loratadine-Pseudoephedrine (ALLERGY RELIEF D-24 PO) Comments:   Reason for Stopping:                     Consultations:   No onsite inpatient consultations were available at this hospital during this admission.  Case was discussed by phone with infectious disease.          Brief History of Illness:      Patient is an 83 year old man with active metastatic non-small cell lung ca being treated with immunomodulatory therapy, CMML with chronic thrombocytopenia, moderate COPD with chronic hypoxic respiratory failure, CAD with remote history of CABG, and dementia who presented to the ED with increasing shortness of breath and cough and was admitted due to concerns for acute on chronic respiratory failure and left upper lobe pneumonia.          Hospital Course:     GREGG pneumonia  Left pleural effusion, suspect empyema  Acute on chronic hypoxic respiratory failure  Probable immunocompromise  SIRS, possible sepsis  Presented with symptoms  and signs typical for pneumonia and new radiographic infiltrate in the left upper lobe as well as small loculated left pleural effusion.  Also presented with worsening leukocytosis compared with baseline, tachypnea, and tachycardia concerning for SIRS.  In context of suspected infection with acute organ dysfunction, SIRS was worrisome for sepsis.  He had worsening hypoxia compared with baseline necessitating more oxygen supplementation than normal.  His chronic respiratory failure is usually treated with 2 L nasal cannula during exertion but not at rest.  Procalcitonin was minimally elevated at 0.09, but CRP was elevated at 115.  He had worsening thrombocytopenia compared with baseline with initial platelets 106 on admission.  Specific causative organism was not identified after negative testing for COVID, influenza, and RSV, negative MRSA testing, negative blood cultures, and negative pleural fluid cultures so far.  Initial CT demonstrated small loculated left pleural effusion that had increased in size compared with July 2023.  At diagnostic thoracentesis of left pleural effusion on 9/29, pleural fluid was grossly bloody with initial test results (elevated pleural fluid total protein and cholesterol, pleural fluid LDH could not be reliably assessed) suspicious for exudate.  Malignant pleural effusion remained a consideration (previous lung cancer was in the left upper lobe).  He is probably immunocompromised while receiving immunomodulatory therapy (last administered August 30) and could be at risk for unusual infection.  He is not known to have had TB previously.  After thoracentesis on 9/29, he worsened clinically with new onset fever, worsening leukocytosis, and rising CRP within 24 hours of diagnostic thoracentesis raising concern for empyema.  No other cause for fever and worsening SIRS was identified.  Chest CT on October 1 demonstrated worsening loculated left pleural effusion.  Antibiotics were switched  empirically from Unasyn to meropenem.  Due to concern for empyema, invasive treatment of empyema appeared medically appropriate and additional specialty consultation was recommended including pulmonology, thoracic surgery, infectious disease, and oncology none of which were available at this hospital, so hospital transfer was recommended with which the patient and his wife were agreeable.  Patient was accepted in transfer by Dr. Sutherland at [facility].  Transfer by ambulance for continuous oxygen therapy and continuous IV fluid therapy during transport was recommended.     Coagulopathy  INR prolonged at 1.19 concerning for coagulopathy.  He is at increased risk for bleeding, but bleeding has not been clinically evident (aside from grossly bloody pleural effusion).  Coagulopathy could be due to inadequate nutritional intake but could also be due to acute organ dysfunction and sepsis.       Hyponatremia  He developed new mild hyponatremia on 10/1, possibly related to ongoing inflammatory lung issues.  He may be at risk for SIADH.  He was not overtly symptomatic from hyponatremia at this time.     Metastatic non-small call lung cancer on immunotherapy  Diagnosed with stage IIb disease in January 2023 with left upper lobe primary tumor and intrathoracic lymph node metastasis.  He follows with [facility] and after initial chemotherapy and radiation he has been maintained on durvalumab immunotherapy treatment monthly (last given August 30).  Next immunotherapy was due 9/27/23 but was not administered due to current hospitalization and concern for infection.  He was hospitalized in July for pneumonitis attributed to immunomodulatory therapy and was treated with corticosteroids but not antibiotics.  During that hospitalization, he was noted to have left pleural effusion.     CML  CML was diagnosed in 2018 after bone marrow biopsy and patient has had chronic mild leukocytosis and thrombocytopenia since then and is  following with [facility].  Per medical records, WBC ranged 8-14 earlier this year but 16-24 in 7/23 when he was being treated with corticosteroids.  Admission WBC was 34, significantly higher than previously raising concern for acute infection.  Recent baseline platelet count is not known.  Platelet was 106 at admission and steadily improved and normalized.  Acute thrombocytopenia due to infection and sepsis.  Most likely.  Although he developed worsening leukocytosis, he appears to have neutrophil predominance suggesting worsening leukocytosis was due to infection rather than primary hematologic neoplasm.     COPD without exacerbation   Patient with known COPD with chronic exertional hypoxia but no hypercapnia.  His chronic exertional hypoxia is managed with exertional use of oxygen supplementation at his normal baseline.  Patient has not had acute wheezing or signs of acute COPD flare and is not being treated with steroids at this time.  Chronic bronchodilators were continued.     CAD  Previous CABG  Hypertension  Diastolic dysfunction  Patient had known history of CAD and CABG many years ago.  He has hypertension chronically treated with beta-blocker and ACE inhibitor.  Initial chest x-ray demonstrated venous congestion, but BNP was normal and patient has no known history of CHF.  Echo during this hospitalization demonstrated grade I diastolic dysfunction but normal EF.  Acute CHF was not suspected clinically.  Chronic beta-blocker and ACE inhibitor therapies were continued.     Dementia  Chronic and clinically stable while continuing chronic doses of Aricept and Namenda.     Obesity  Chronic with BMI 33 was present for which no acute intervention was needed during hospitalization.     Port-A-Cath in place  He has chronic indwelling Port-A-Cath that was used for IV access during this hospitalization including continuous IV fluids at the time of transfer.             Transfer Instructions and Follow-Up:      Discharge diet: Regular   Discharge activity: Activity as tolerated     Code status: Full Code  VTE prophylaxis: Enoxaparin (Lovenox) SQ    Reason for transfer: Specialty inpatient care not available at this hospital including potentially thoracic surgery, pulmonology, infectious disease, and oncology    Pending test results: Pleural fluid cultures and cytology         Transfer Disposition:      Transferred to [facility]     Condition at discharge: stable    Attestation:  I have reviewed today's vital signs, notes, medications, and test results.  Amount of time performed on preparing and coordinating this transfer today: Greater than 30 minutes.    Francisco Polanco MD

## 2023-10-01 NOTE — PLAN OF CARE
Goal Outcome Evaluation:           Overall Patient Progress: decliningOverall Patient Progress: declining     Vitals stable with exception tachycardia and temp from . Pt flagged lactic x2. 2.2 was first results, provider updated and gave orders to give 500 bolus and draw whole lactic. After bolus Pt Hr decreased from 120-130's to 110 alexander. Second lactic was sent to lab with whole lactic also after bolus was complete. Waiting for results and will update nurse taking over at 2300.

## 2023-10-01 NOTE — PLAN OF CARE
"Goal Outcome Evaluation:      Plan of Care Reviewed With: patient    Overall Patient Progress: no changeOverall Patient Progress: no change    Outcome Evaluation: A&Ox4. Fine crackles auscultated in lower lobes. Sats lower 90s on 3 L NC. Tachycardic. Assist x1-2 to standing scale with GB. Uses urinal independently in bed. Malodorous, saurabh urine. Lactic 1.1. Port-a-cath infusing TKO @ 10 mL/hr. IV unasyn given x1. Denies pain this shift. CHG wipes done, linen changed. Up about 2 lbs from last weight. Potassium 3.7 this am, no replacement needed. Patient's wife called and was given an update about the night.     /62 (BP Location: Left arm)   Pulse 109   Temp 98.8  F (37.1  C) (Oral)   Resp 22   Ht 1.676 m (5' 6\")   Wt 94.3 kg (208 lb)   SpO2 94%   BMI 33.57 kg/m       "

## 2023-10-02 NOTE — PROGRESS NOTES
Pt was transferred out by EMS to [facility]. EMS arrived at 1945. All Pt belongings were taking home by family.